# Patient Record
Sex: MALE | Race: WHITE | NOT HISPANIC OR LATINO | Employment: PART TIME | URBAN - METROPOLITAN AREA
[De-identification: names, ages, dates, MRNs, and addresses within clinical notes are randomized per-mention and may not be internally consistent; named-entity substitution may affect disease eponyms.]

---

## 2018-07-09 ENCOUNTER — OFFICE VISIT (OUTPATIENT)
Dept: MEDICAL GROUP | Facility: PHYSICIAN GROUP | Age: 43
End: 2018-07-09
Payer: COMMERCIAL

## 2018-07-09 VITALS
HEART RATE: 94 BPM | TEMPERATURE: 97.3 F | BODY MASS INDEX: 37.47 KG/M2 | HEIGHT: 69 IN | WEIGHT: 253 LBS | DIASTOLIC BLOOD PRESSURE: 70 MMHG | SYSTOLIC BLOOD PRESSURE: 110 MMHG | OXYGEN SATURATION: 95 % | RESPIRATION RATE: 14 BRPM

## 2018-07-09 DIAGNOSIS — S88.111D UNILATERAL COMPLETE BKA, RIGHT, SUBSEQUENT ENCOUNTER (HCC): ICD-10-CM

## 2018-07-09 DIAGNOSIS — Z00.00 WELL ADULT EXAM: ICD-10-CM

## 2018-07-09 DIAGNOSIS — Z87.2 HISTORY OF FOLLICULITIS: ICD-10-CM

## 2018-07-09 DIAGNOSIS — E66.9 OBESITY (BMI 30-39.9): ICD-10-CM

## 2018-07-09 PROCEDURE — 99386 PREV VISIT NEW AGE 40-64: CPT | Performed by: FAMILY MEDICINE

## 2018-07-09 RX ORDER — DOXYCYCLINE HYCLATE 100 MG
100 TABLET ORAL 2 TIMES DAILY
Qty: 20 TAB | Refills: 0 | Status: SHIPPED | OUTPATIENT
Start: 2018-07-09 | End: 2022-03-15

## 2018-07-09 ASSESSMENT — ENCOUNTER SYMPTOMS
PALPITATIONS: 0
COUGH: 0
DEPRESSION: 0
DOUBLE VISION: 0
GASTROINTESTINAL NEGATIVE: 1
DIZZINESS: 0
HEADACHES: 0
MUSCULOSKELETAL NEGATIVE: 1
NAUSEA: 0
TINGLING: 0
FEVER: 0
RESPIRATORY NEGATIVE: 1
CARDIOVASCULAR NEGATIVE: 1
MYALGIAS: 0
HEARTBURN: 0
CONSTITUTIONAL NEGATIVE: 1
CHILLS: 0
PSYCHIATRIC NEGATIVE: 1
HEMOPTYSIS: 0
BRUISES/BLEEDS EASILY: 0
BLURRED VISION: 0
EYES NEGATIVE: 1
NEUROLOGICAL NEGATIVE: 1

## 2018-07-09 ASSESSMENT — PATIENT HEALTH QUESTIONNAIRE - PHQ9: CLINICAL INTERPRETATION OF PHQ2 SCORE: 0

## 2018-07-10 NOTE — PROGRESS NOTES
Subjective:      Telly Solomon is a 43 y.o. male who presents with Leg Pain            1. Unilateral complete BKA, right, subsequent encounter ([facility])  h/o right bka from fall 5 years ago  - Patient identified as having weight management issue.  Appropriate orders and counseling given.  - NON SPECIFIED; Right knee bka care  Dispense: 1 Each; Refill: 0  - doxycycline (VIBRAMYCIN) 100 MG Tab; Take 1 Tab by mouth 2 times a day.  Dispense: 20 Tab; Refill: 0  - COMP METABOLIC PANEL; Future  - FREE THYROXINE; Future  - LIPID PROFILE; Future  - TRIIDOTHYRONINE; Future  - TSH; Future  - VITAMIN D,25 HYDROXY; Future  - CBC WITHOUT DIFFERENTIAL; Future    2. Obesity (BMI 30-39.9)    - Patient identified as having weight management issue.  Appropriate orders and counseling given.  - NON SPECIFIED; Right knee bka care  Dispense: 1 Each; Refill: 0  - doxycycline (VIBRAMYCIN) 100 MG Tab; Take 1 Tab by mouth 2 times a day.  Dispense: 20 Tab; Refill: 0  - COMP METABOLIC PANEL; Future  - FREE THYROXINE; Future  - LIPID PROFILE; Future  - TRIIDOTHYRONINE; Future  - TSH; Future  - VITAMIN D,25 HYDROXY; Future  - CBC WITHOUT DIFFERENTIAL; Future    3. History of folliculitis  Gets some infected hair follicles on back of lower leg at times likely due to using sleeve to go into prosthesis  Gave him rx for abx if this happens again but also advised on the use of cetaphil soap to prevent infections  - Patient identified as having weight management issue.  Appropriate orders and counseling given.  - NON SPECIFIED; Right knee bka care  Dispense: 1 Each; Refill: 0  - doxycycline (VIBRAMYCIN) 100 MG Tab; Take 1 Tab by mouth 2 times a day.  Dispense: 20 Tab; Refill: 0  - COMP METABOLIC PANEL; Future  - FREE THYROXINE; Future  - LIPID PROFILE; Future  - TRIIDOTHYRONINE; Future  - TSH; Future  - VITAMIN D,25 HYDROXY; Future  - CBC WITHOUT DIFFERENTIAL; Future    4. Well adult exam    - Patient identified as having weight management issue.   Appropriate orders and counseling given.  - NON SPECIFIED; Right knee bka care  Dispense: 1 Each; Refill: 0  - doxycycline (VIBRAMYCIN) 100 MG Tab; Take 1 Tab by mouth 2 times a day.  Dispense: 20 Tab; Refill: 0  - COMP METABOLIC PANEL; Future  - FREE THYROXINE; Future  - LIPID PROFILE; Future  - TRIIDOTHYRONINE; Future  - TSH; Future  - VITAMIN D,25 HYDROXY; Future  - CBC WITHOUT DIFFERENTIAL; Future    Past Medical History:  No date: Heart burn  04-15-13: Pain      Comment: back, right leg, 3-4/10  No date: Snoring  Past Surgical History:  5/7/2013: IRRIGATION & DEBRIDEMENT ORTHO      Comment: Performed by Elvis Bowen M.D. at                SURGERY [facility] ORS  5/7/2013: WOUND CLOSURE ORTHO      Comment: Performed by Elvis Bowen M.D. at                SURGERY [facility] ORS  4/17/2013: KNEE AMPUTATION BELOW      Comment: Performed by Elvis Bowen M.D. at                SURGERY SAME DAY [facility] ORS  6/29/2012: KNEE AMPUTATION BELOW      Comment: Performed by ELVIS BOWEN at SURGERY                [facility] ORS  6/25/2012: IRRIGATION & DEBRIDEMENT GENERAL      Comment: Performed by JEANETTE DONALDSON JR at SURGERY               [facility] ORS  6/25/2012: FLAP FREE      Comment: Performed by JEANETTE DONALDSON JR at SURGERY               [facility] ORS  6/25/2012: SPLIT THICKNESS SKIN GRAFT      Comment: Performed by JEANETTE DONALDSON JR at SURGERY               [facility] ORS  6/25/2012: FLAP FREE      Comment: Performed by JEANETTE DONALDSON JR at SURGERY               [facility] ORS  6/24/2012: TIBIA ORIF      Comment: Performed by MERLINE MARTIN at SURGERY                [facility] ORS  6/24/2012: IRRIGATION & DEBRIDEMENT ORTHO      Comment: Performed by MERLINE MARTIN at SURGERY                [facility] ORS  6/19/2012: IRRIGATION & DEBRIDEMENT ORTHO      Comment: Performed by MERLINE MARTIN at SURGERY                [facility] ORS  6/17/2012: INCISION AND  "DRAINAGE ORTHOPEDIC      Comment: Performed by MERLINE MARTIN at SURGERY                [facility] ORS  6/17/2012: EXTERNAL FIXATOR APPLICATION      Comment: Performed by MERLINE MARTIN at SURGERY                [facility] ORS  6/15/2012: EXTERNAL FIXATOR APPLICATION      Comment: Performed by JAYLA LARIOS at SURGERY [facility] ORS  6/15/2012: INCISION AND DRAINAGE ORTHOPEDIC      Comment: Performed by JAYLA LARIOS at SURGERY [facility] ORS  6/15/2012: CHEST TUBE INSERTION      Comment: Performed by JAYLA LARIOS at SURGERY [facility] ORS  Smoking status: Current Some Day Smoker                                                    Packs/day: 0.00      Years: 0.00         Types: Cigarettes  Smokeless tobacco: Never Used                      Comment: \"about 3-4 cigarettes per day\"  Alcohol use: No              History reviewed.  No pertinent family history.      Current Outpatient Prescriptions: •  NON SPECIFIED, Right knee bka care, Disp: 1 Each, Rfl: 0•  doxycycline (VIBRAMYCIN) 100 MG Tab, Take 1 Tab by mouth 2 times a day., Disp: 20 Tab, Rfl: 0•  oxycodone, immediate release, (ROXICODONE) 5 MG TABS, Take 1 Tab by mouth every four hours as needed for Mild Pain. (Patient not taking: Reported on 7/9/2018), Disp: 60 Tab, Rfl: 0•  oxycodone, immediate release, (ROXICODONE) 5 MG TABS, Take 1-3 Tabs by mouth every 3 hours as needed ( Take one tablet for mild to moderate pain, take two tablets for moderate pain, take three tablets for moderate-severe). Indications: Moderate to Severe Pain (Patient not taking: Reported on 7/9/2018), Disp: 60 Tab, Rfl: 0•  alprazolam (XANAX) 0.5 MG TABS, Take 0.5 mg by mouth as needed., Disp: , Rfl:      Patient was instructed on the use of medications, either prescriptions or OTC and informed on when the appropriate follow up time period should be. In addition, patient was also instructed that should any acute worsening occur that they " "should notify this clinic asap or call 911.            Review of Systems   Constitutional: Negative.  Negative for chills and fever.   HENT: Negative.  Negative for hearing loss.    Eyes: Negative.  Negative for blurred vision and double vision.   Respiratory: Negative.  Negative for cough and hemoptysis.    Cardiovascular: Negative.  Negative for chest pain and palpitations.   Gastrointestinal: Negative.  Negative for heartburn and nausea.   Genitourinary: Negative.  Negative for dysuria.   Musculoskeletal: Negative.  Negative for myalgias.   Skin: Positive for itching and rash.   Neurological: Negative.  Negative for dizziness, tingling and headaches.   Endo/Heme/Allergies: Negative.  Does not bruise/bleed easily.   Psychiatric/Behavioral: Negative.  Negative for depression and suicidal ideas.   All other systems reviewed and are negative.         Objective:     /70   Pulse 94   Temp 36.3 °C (97.3 °F)   Resp 14   Ht 1.753 m (5' 9\")   Wt 114.8 kg (253 lb)   SpO2 95%   BMI 37.36 kg/m²      Physical Exam   Constitutional: He is oriented to person, place, and time. He appears well-developed and well-nourished. No distress.   HENT:   Head: Normocephalic and atraumatic.   Mouth/Throat: Oropharynx is clear and moist. No oropharyngeal exudate.   Eyes: Pupils are equal, round, and reactive to light.   Cardiovascular: Normal rate, regular rhythm, normal heart sounds and intact distal pulses.  Exam reveals no gallop and no friction rub.    No murmur heard.  Pulmonary/Chest: Effort normal and breath sounds normal. No respiratory distress. He has no wheezes. He has no rales. He exhibits no tenderness.   Neurological: He is alert and oriented to person, place, and time.   Skin: He is not diaphoretic.   Scarring from infected hair follicles on posterior right lower left stump, no active infection   Psychiatric: He has a normal mood and affect. His behavior is normal. Judgment and thought content normal.   Nursing " note and vitals reviewed.              Assessment/Plan:     1. Unilateral complete BKA, right, subsequent encounter ([facility])    - Patient identified as having weight management issue.  Appropriate orders and counseling given.  - NON SPECIFIED; Right knee bka care  Dispense: 1 Each; Refill: 0  - doxycycline (VIBRAMYCIN) 100 MG Tab; Take 1 Tab by mouth 2 times a day.  Dispense: 20 Tab; Refill: 0  - COMP METABOLIC PANEL; Future  - FREE THYROXINE; Future  - LIPID PROFILE; Future  - TRIIDOTHYRONINE; Future  - TSH; Future  - VITAMIN D,25 HYDROXY; Future  - CBC WITHOUT DIFFERENTIAL; Future    2. Obesity (BMI 30-39.9)    - Patient identified as having weight management issue.  Appropriate orders and counseling given.  - NON SPECIFIED; Right knee bka care  Dispense: 1 Each; Refill: 0  - doxycycline (VIBRAMYCIN) 100 MG Tab; Take 1 Tab by mouth 2 times a day.  Dispense: 20 Tab; Refill: 0  - COMP METABOLIC PANEL; Future  - FREE THYROXINE; Future  - LIPID PROFILE; Future  - TRIIDOTHYRONINE; Future  - TSH; Future  - VITAMIN D,25 HYDROXY; Future  - CBC WITHOUT DIFFERENTIAL; Future    3. History of folliculitis    - Patient identified as having weight management issue.  Appropriate orders and counseling given.  - NON SPECIFIED; Right knee bka care  Dispense: 1 Each; Refill: 0  - doxycycline (VIBRAMYCIN) 100 MG Tab; Take 1 Tab by mouth 2 times a day.  Dispense: 20 Tab; Refill: 0  - COMP METABOLIC PANEL; Future  - FREE THYROXINE; Future  - LIPID PROFILE; Future  - TRIIDOTHYRONINE; Future  - TSH; Future  - VITAMIN D,25 HYDROXY; Future  - CBC WITHOUT DIFFERENTIAL; Future    4. Well adult e  - Patient identified as having weight management issue.  Appropriate orders and counseling given.  - NON SPECIFIED; Right knee bka care  Dispense: 1 Each; Refill: 0  - doxycycline (VIBRAMYCIN) 100 MG Tab; Take 1 Tab by mouth 2 times a day.  Dispense: 20 Tab; Refill: 0  - COMP METABOLIC PANEL; Future  - FREE THYROXINE; Future  - LIPID PROFILE; Future  -  TRIIDOTHYRONINE; Future  - TSH; Future  - VITAMIN D,25 HYDROXY; Future  - CBC WITHOUT DIFFERENTIAL; Future

## 2018-07-16 ENCOUNTER — HOSPITAL ENCOUNTER (OUTPATIENT)
Dept: LAB | Facility: MEDICAL CENTER | Age: 43
End: 2018-07-16
Attending: FAMILY MEDICINE
Payer: COMMERCIAL

## 2018-07-16 DIAGNOSIS — Z00.00 WELL ADULT EXAM: ICD-10-CM

## 2018-07-16 DIAGNOSIS — Z87.2 HISTORY OF FOLLICULITIS: ICD-10-CM

## 2018-07-16 DIAGNOSIS — S88.111D UNILATERAL COMPLETE BKA, RIGHT, SUBSEQUENT ENCOUNTER (HCC): ICD-10-CM

## 2018-07-16 DIAGNOSIS — E66.9 OBESITY (BMI 30-39.9): ICD-10-CM

## 2018-07-16 LAB
25(OH)D3 SERPL-MCNC: 20 NG/ML (ref 30–100)
ERYTHROCYTE [DISTWIDTH] IN BLOOD BY AUTOMATED COUNT: 43.8 FL (ref 35.9–50)
HCT VFR BLD AUTO: 51.2 % (ref 42–52)
HGB BLD-MCNC: 17.6 G/DL (ref 14–18)
MCH RBC QN AUTO: 30.7 PG (ref 27–33)
MCHC RBC AUTO-ENTMCNC: 34.4 G/DL (ref 33.7–35.3)
MCV RBC AUTO: 89.2 FL (ref 81.4–97.8)
PLATELET # BLD AUTO: 229 K/UL (ref 164–446)
PMV BLD AUTO: 9.9 FL (ref 9–12.9)
RBC # BLD AUTO: 5.74 M/UL (ref 4.7–6.1)
T3 SERPL-MCNC: 90.3 NG/DL (ref 60–181)
T4 FREE SERPL-MCNC: 0.75 NG/DL (ref 0.53–1.43)
TSH SERPL DL<=0.005 MIU/L-ACNC: 1.9 UIU/ML (ref 0.38–5.33)
WBC # BLD AUTO: 7.3 K/UL (ref 4.8–10.8)

## 2018-07-16 PROCEDURE — 84443 ASSAY THYROID STIM HORMONE: CPT

## 2018-07-16 PROCEDURE — 85027 COMPLETE CBC AUTOMATED: CPT

## 2018-07-16 PROCEDURE — 84439 ASSAY OF FREE THYROXINE: CPT

## 2018-07-16 PROCEDURE — 36415 COLL VENOUS BLD VENIPUNCTURE: CPT

## 2018-07-16 PROCEDURE — 80053 COMPREHEN METABOLIC PANEL: CPT

## 2018-07-16 PROCEDURE — 80061 LIPID PANEL: CPT

## 2018-07-16 PROCEDURE — 82306 VITAMIN D 25 HYDROXY: CPT

## 2018-07-16 PROCEDURE — 84480 ASSAY TRIIODOTHYRONINE (T3): CPT

## 2018-07-17 LAB
ALBUMIN SERPL BCP-MCNC: 4.1 G/DL (ref 3.2–4.9)
ALBUMIN/GLOB SERPL: 1.5 G/DL
ALP SERPL-CCNC: 73 U/L (ref 30–99)
ALT SERPL-CCNC: 26 U/L (ref 2–50)
ANION GAP SERPL CALC-SCNC: 8 MMOL/L (ref 0–11.9)
AST SERPL-CCNC: 19 U/L (ref 12–45)
BILIRUB SERPL-MCNC: 0.4 MG/DL (ref 0.1–1.5)
BUN SERPL-MCNC: 10 MG/DL (ref 8–22)
CALCIUM SERPL-MCNC: 9.3 MG/DL (ref 8.5–10.5)
CHLORIDE SERPL-SCNC: 108 MMOL/L (ref 96–112)
CHOLEST SERPL-MCNC: 183 MG/DL (ref 100–199)
CO2 SERPL-SCNC: 24 MMOL/L (ref 20–33)
CREAT SERPL-MCNC: 0.91 MG/DL (ref 0.5–1.4)
GLOBULIN SER CALC-MCNC: 2.7 G/DL (ref 1.9–3.5)
GLUCOSE SERPL-MCNC: 104 MG/DL (ref 65–99)
HDLC SERPL-MCNC: 47 MG/DL
LDLC SERPL CALC-MCNC: 103 MG/DL
POTASSIUM SERPL-SCNC: 4.7 MMOL/L (ref 3.6–5.5)
PROT SERPL-MCNC: 6.8 G/DL (ref 6–8.2)
SODIUM SERPL-SCNC: 140 MMOL/L (ref 135–145)
TRIGL SERPL-MCNC: 167 MG/DL (ref 0–149)

## 2018-07-19 ENCOUNTER — TELEPHONE (OUTPATIENT)
Dept: MEDICAL GROUP | Facility: PHYSICIAN GROUP | Age: 43
End: 2018-07-19

## 2018-07-19 NOTE — TELEPHONE ENCOUNTER
----- Message from Hayder Lal M.D. sent at 7/17/2018 11:22 AM PDT -----  Labs show patient is low on vitamin d, they needs to replace this with 2000 units per day otc

## 2020-01-13 ENCOUNTER — OFFICE VISIT (OUTPATIENT)
Dept: MEDICAL GROUP | Facility: PHYSICIAN GROUP | Age: 45
End: 2020-01-13
Payer: COMMERCIAL

## 2020-01-13 VITALS
WEIGHT: 266 LBS | HEIGHT: 70 IN | BODY MASS INDEX: 38.08 KG/M2 | HEART RATE: 82 BPM | OXYGEN SATURATION: 96 % | TEMPERATURE: 97.5 F | DIASTOLIC BLOOD PRESSURE: 76 MMHG | SYSTOLIC BLOOD PRESSURE: 138 MMHG

## 2020-01-13 DIAGNOSIS — E66.9 OBESITY (BMI 30-39.9): ICD-10-CM

## 2020-01-13 DIAGNOSIS — H65.01 RIGHT ACUTE SEROUS OTITIS MEDIA, RECURRENCE NOT SPECIFIED: ICD-10-CM

## 2020-01-13 DIAGNOSIS — B35.4 TINEA CORPORIS: ICD-10-CM

## 2020-01-13 PROCEDURE — 99214 OFFICE O/P EST MOD 30 MIN: CPT | Performed by: FAMILY MEDICINE

## 2020-01-13 RX ORDER — CLOTRIMAZOLE AND BETAMETHASONE DIPROPIONATE 10; .64 MG/G; MG/G
1 CREAM TOPICAL 2 TIMES DAILY
Qty: 1 TUBE | Refills: 3 | Status: SHIPPED | OUTPATIENT
Start: 2020-01-13 | End: 2021-02-01

## 2020-01-13 RX ORDER — AMOXICILLIN 500 MG/1
500 CAPSULE ORAL 3 TIMES DAILY
Qty: 30 CAP | Refills: 0 | Status: SHIPPED | OUTPATIENT
Start: 2020-01-13 | End: 2022-03-15

## 2020-01-13 RX ORDER — HYDROCODONE BITARTRATE AND ACETAMINOPHEN 5; 325 MG/1; MG/1
1-2 TABLET ORAL EVERY 4 HOURS PRN
COMMUNITY

## 2020-01-13 RX ORDER — AZITHROMYCIN 500 MG/1
TABLET, FILM COATED ORAL
COMMUNITY
Start: 2020-01-02 | End: 2022-03-15

## 2020-01-13 ASSESSMENT — ENCOUNTER SYMPTOMS
PSYCHIATRIC NEGATIVE: 1
CHILLS: 0
CONSTITUTIONAL NEGATIVE: 1
CARDIOVASCULAR NEGATIVE: 1
COUGH: 0
BRUISES/BLEEDS EASILY: 0
HEADACHES: 0
FEVER: 0
NAUSEA: 0
RESPIRATORY NEGATIVE: 1
MYALGIAS: 0
HEARTBURN: 0
BLURRED VISION: 0
DEPRESSION: 0
DOUBLE VISION: 0
EYES NEGATIVE: 1
MUSCULOSKELETAL NEGATIVE: 1
TINGLING: 0
GASTROINTESTINAL NEGATIVE: 1
DIZZINESS: 0
HEMOPTYSIS: 0
PALPITATIONS: 0
NEUROLOGICAL NEGATIVE: 1

## 2020-01-13 NOTE — PROGRESS NOTES
Subjective:      Telly Solomon is a 45 y.o. male who presents with Ear Fullness (x1 month. Loss of hearing in Rt ear, sinus congestion.) and Rash (Rash on Lt leg and flanks.)            1. Tinea corporis  Macular rash on leg and body  - clotrimazole-betamethasone (LOTRISONE) 1-0.05 % Cream; Apply 1 g to affected area(s) 2 times a day.  Dispense: 1 Tube; Refill: 3    2. Right acute serous otitis media, recurrence not specified  Pain in the right ear with decreased hearing for one month, no fever or chills  - amoxicillin (AMOXIL) 500 MG Cap; Take 1 Cap by mouth 3 times a day.  Dispense: 30 Cap; Refill: 0    3. Obesity (BMI 30-39.9)  Patient has a diagnosis of obesity. They were counseled today on increasing exercise and  extensively counseled on a low carb diet       Past Medical History:  No date: Heart burn  04-15-13: Pain      Comment:  back, right leg, 3-4/10  No date: Snoring  Past Surgical History:  5/7/2013: IRRIGATION & DEBRIDEMENT ORTHO      Comment:  Performed by Elvis Low M.D. at SURGERY [facility] ORS  5/7/2013: WOUND CLOSURE ORTHO      Comment:  Performed by Elvis Low M.D. at SURGERY [facility] ORS  4/17/2013: KNEE AMPUTATION BELOW      Comment:  Performed by Elvis Low M.D. at SURGERY SAME DAY               [facility] ORS  6/29/2012: KNEE AMPUTATION BELOW      Comment:  Performed by ELVIS LOW at SURGERY [facility]                ORS  6/25/2012: IRRIGATION & DEBRIDEMENT GENERAL      Comment:  Performed by JEANETTE DONALDSON JR at SURGERY [facility] ORS  6/25/2012: FLAP FREE      Comment:  Performed by JEANETTE DONALDSON JR at SURGERY [facility] ORS  6/25/2012: SPLIT THICKNESS SKIN GRAFT      Comment:  Performed by JEANETTE DONALDSON JR at SURGERY [facility] ORS  6/25/2012: FLAP FREE      Comment:  Performed by JEANETTE DONALDSON JR at SURGERY [facility] ORS  6/24/2012:  "TIBIA ORIF      Comment:  Performed by MERLINE MARTIN at SURGERY [facility]                ORS  6/24/2012: IRRIGATION & DEBRIDEMENT ORTHO      Comment:  Performed by MERLINE MARTIN at SURGERY [facility]                ORS  6/19/2012: IRRIGATION & DEBRIDEMENT ORTHO      Comment:  Performed by MERLINE MARTIN at SURGERY [facility]                ORS  6/17/2012: INCISION AND DRAINAGE ORTHOPEDIC      Comment:  Performed by MERLINE MARTIN at SURGERY [facility]                ORS  6/17/2012: EXTERNAL FIXATOR APPLICATION      Comment:  Performed by MERLINE MARTIN at SURGERY [facility]                ORS  6/15/2012: EXTERNAL FIXATOR APPLICATION      Comment:  Performed by JAYLA LARIOS at SURGERY [facility] ORS  6/15/2012: INCISION AND DRAINAGE ORTHOPEDIC      Comment:  Performed by JAYLA LARIOS at SURGERY [facility] ORS  6/15/2012: CHEST TUBE INSERTION      Comment:  Performed by JAYLA LARIOS at SURGERY [facility] ORS  Social History    Tobacco Use      Smoking status: Current Some Day Smoker        Types: Cigarettes      Smokeless tobacco: Never Used      Tobacco comment: \"about 3-4 cigarettes per day\"    Alcohol use: No    Drug use: No    No family history on file.      Current Outpatient Medications: •  HYDROcodone-acetaminophen (NORCO) 5-325 MG Tab per tablet, Take 1-2 Tabs by mouth every four hours as needed (1 PO Daily PRN)., Disp: , Rfl: •  clotrimazole-betamethasone (LOTRISONE) 1-0.05 % Cream, Apply 1 g to affected area(s) 2 times a day., Disp: 1 Tube, Rfl: 3•  amoxicillin (AMOXIL) 500 MG Cap, Take 1 Cap by mouth 3 times a day., Disp: 30 Cap, Rfl: 0•  azithromycin (ZITHROMAX) 500 MG tablet, , Disp: , Rfl: •  NON SPECIFIED, Right knee bka care (Patient not taking: Reported on 1/13/2020), Disp: 1 Each, Rfl: 0•  doxycycline (VIBRAMYCIN) 100 MG Tab, Take 1 Tab by mouth 2 times a day. (Patient not taking: Reported on 1/13/2020), Disp: 20 Tab, Rfl: 0•  oxycodone, immediate release, (ROXICODONE) 5 MG " "TABS, Take 1 Tab by mouth every four hours as needed for Mild Pain. (Patient not taking: Reported on 7/9/2018), Disp: 60 Tab, Rfl: 0•  oxycodone, immediate release, (ROXICODONE) 5 MG TABS, Take 1-3 Tabs by mouth every 3 hours as needed ( Take one tablet for mild to moderate pain, take two tablets for moderate pain, take three tablets for moderate-severe). Indications: Moderate to Severe Pain (Patient not taking: Reported on 7/9/2018), Disp: 60 Tab, Rfl: 0•  alprazolam (XANAX) 0.5 MG TABS, Take 0.5 mg by mouth as needed., Disp: , Rfl:      Patient was instructed on the use of medications, either prescriptions or OTC and informed on when the appropriate follow up time period should be. In addition, patient was also instructed that should any acute worsening occur that they should notify this clinic asap or call 911.          Review of Systems   Constitutional: Negative.  Negative for chills and fever.   HENT: Positive for ear pain. Negative for hearing loss.    Eyes: Negative.  Negative for blurred vision and double vision.   Respiratory: Negative.  Negative for cough and hemoptysis.    Cardiovascular: Negative.  Negative for chest pain and palpitations.   Gastrointestinal: Negative.  Negative for heartburn and nausea.   Genitourinary: Negative.  Negative for dysuria.   Musculoskeletal: Negative.  Negative for myalgias.   Skin: Positive for itching and rash.   Neurological: Negative.  Negative for dizziness, tingling and headaches.   Endo/Heme/Allergies: Negative.  Does not bruise/bleed easily.   Psychiatric/Behavioral: Negative.  Negative for depression and suicidal ideas.   All other systems reviewed and are negative.         Objective:     /76   Pulse 82   Temp 36.4 °C (97.5 °F)   Ht 1.778 m (5' 10\")   Wt 120.7 kg (266 lb)   SpO2 96%   BMI 38.17 kg/m²      Physical Exam  Vitals signs and nursing note reviewed.   Constitutional:       General: He is not in acute distress.     Appearance: He is " well-developed. He is not diaphoretic.   HENT:      Head: Normocephalic and atraumatic.      Right Ear: A middle ear effusion is present. Tympanic membrane is injected and erythematous.      Mouth/Throat:      Pharynx: No oropharyngeal exudate.   Eyes:      Pupils: Pupils are equal, round, and reactive to light.   Cardiovascular:      Rate and Rhythm: Normal rate and regular rhythm.      Heart sounds: Normal heart sounds. No murmur. No friction rub. No gallop.    Pulmonary:      Effort: Pulmonary effort is normal. No respiratory distress.      Breath sounds: Normal breath sounds. No wheezing or rales.   Chest:      Chest wall: No tenderness.   Skin:     Findings: Rash present. Rash is macular.          Neurological:      Mental Status: He is alert and oriented to person, place, and time.   Psychiatric:         Behavior: Behavior normal.         Thought Content: Thought content normal.         Judgment: Judgment normal.                 Assessment/Plan:       1. Tinea corporis    - clotrimazole-betamethasone (LOTRISONE) 1-0.05 % Cream; Apply 1 g to affected area(s) 2 times a day.  Dispense: 1 Tube; Refill: 3    2. Right acute serous otitis media, recurrence not specified  ]  - amoxicillin (AMOXIL) 500 MG Cap; Take 1 Cap by mouth 3 times a day.  Dispense: 30 Cap; Refill: 0    3. Obesity (BMI 30-39.9)

## 2021-02-01 DIAGNOSIS — B35.4 TINEA CORPORIS: ICD-10-CM

## 2021-02-01 RX ORDER — CLOTRIMAZOLE AND BETAMETHASONE DIPROPIONATE 10; .64 MG/G; MG/G
CREAM TOPICAL
Qty: 15 G | Refills: 2 | Status: SHIPPED | OUTPATIENT
Start: 2021-02-01 | End: 2022-03-15

## 2021-02-01 NOTE — TELEPHONE ENCOUNTER
Received request via: Pharmacy    Was the patient seen in the last year in this department? No     Does the patient have an active prescription (recently filled or refills available) for medication(s) requested? No     Patient has not been seen in 1 year, I made the patient an appointment for 2/9/21 at 8:30.

## 2021-02-09 ENCOUNTER — TELEMEDICINE (OUTPATIENT)
Dept: MEDICAL GROUP | Facility: PHYSICIAN GROUP | Age: 46
End: 2021-02-09
Payer: COMMERCIAL

## 2021-02-09 VITALS — BODY MASS INDEX: 34.36 KG/M2 | HEIGHT: 70 IN | WEIGHT: 240 LBS

## 2021-02-09 DIAGNOSIS — Z00.00 WELL ADULT EXAM: ICD-10-CM

## 2021-02-09 DIAGNOSIS — S88.111D UNILATERAL COMPLETE BKA, RIGHT, SUBSEQUENT ENCOUNTER (HCC): ICD-10-CM

## 2021-02-09 DIAGNOSIS — E66.9 OBESITY (BMI 30-39.9): ICD-10-CM

## 2021-02-09 PROCEDURE — 99396 PREV VISIT EST AGE 40-64: CPT | Mod: 95,CR | Performed by: FAMILY MEDICINE

## 2021-02-09 NOTE — PROGRESS NOTES
Virtual Visit: Established Patient   This visit was conducted via Zoom using secure and encrypted videoconferencing technology. The patient was in a private location in the state [facility].    The patient's identity was confirmed and verbal consent was obtained for this virtual visit.    Subjective:   CC:   Chief Complaint   Patient presents with   • Annual Exam     lab orders        Telly Solomon is a 46 y.o. male presenting for evaluation and management of:    1. Well adult exam  This patient was here for a preventative medicine visit today. The Health Maintenance issues that are appropriate for this patient's age and sex were discussed and any that they agreed to were ordered. The patient was also give age and sex appropriate counseling for preventative matters such as diet, exercise, medication usage, and social determinants.    - Comp Metabolic Panel; Future  - FREE THYROXINE; Future  - TRIIDOTHYRONINE; Future  - Lipid Profile; Future  - CBC WITHOUT DIFFERENTIAL; Future    2. Obesity (BMI 30-39.9)      - Comp Metabolic Panel; Future  - FREE THYROXINE; Future  - TRIIDOTHYRONINE; Future  - Lipid Profile; Future  - CBC WITHOUT DIFFERENTIAL; Future    Past Medical History:   Diagnosis Date   • Heart burn    • Pain 04-15-13    back, right leg, 3-4/10   • Snoring      Past Surgical History:   Procedure Laterality Date   • IRRIGATION & DEBRIDEMENT ORTHO  5/7/2013    Performed by Elvis Low M.D. at SURGERY [facility]   • WOUND CLOSURE ORTHO  5/7/2013    Performed by Elvis Low M.D. at SURGERY [facility]   • KNEE AMPUTATION BELOW  4/17/2013    Performed by Elvis Low M.D. at SURGERY SAME DAY [facility]   • KNEE AMPUTATION BELOW  6/29/2012    Performed by ELVIS LOW at SURGERY [facility]   • IRRIGATION & DEBRIDEMENT GENERAL  6/25/2012    Performed by JEANETTE DONALDSON JR at SURGERY [facility]   • FLAP FREE  6/25/2012    Performed by JEANETTE DONALDSON JR at  "SURGERY [facility] ORS   • SPLIT THICKNESS SKIN GRAFT  6/25/2012    Performed by JEANETTE DONALDSON JR at SURGERY [facility] ORS   • FLAP FREE  6/25/2012    Performed by JEANETTE DONALDSON JR at SURGERY [facility]   • TIBIA ORIF  6/24/2012    Performed by MERLINE MARTIN at SURGERY [facility] ORS   • IRRIGATION & DEBRIDEMENT ORTHO  6/24/2012    Performed by MERLINE MARTIN at SURGERY [facility] ORS   • IRRIGATION & DEBRIDEMENT ORTHO  6/19/2012    Performed by MERLINE MARTIN at SURGERY [facility] ORS   • INCISION AND DRAINAGE ORTHOPEDIC  6/17/2012    Performed by MERLINE MARTIN at SURGERY [facility] ORS   • EXTERNAL FIXATOR APPLICATION  6/17/2012    Performed by MERLINE MARTIN at [facility]   • EXTERNAL FIXATOR APPLICATION  6/15/2012    Performed by JAYLA LARIOS at SURGERY [facility]   • INCISION AND DRAINAGE ORTHOPEDIC  6/15/2012    Performed by JAYLA LARIOS at SURGERY [facility]   • CHEST TUBE INSERTION  6/15/2012    Performed by JAYLA LARIOS at SURGERY [facility]     Social History     Tobacco Use   • Smoking status: Current Some Day Smoker     Types: Cigarettes   • Smokeless tobacco: Never Used   • Tobacco comment: \"about 3-4 cigarettes per day\"   Substance Use Topics   • Alcohol use: No   • Drug use: No     No family history on file.      Current Outpatient Medications:   •  HYDROcodone-acetaminophen (NORCO) 5-325 MG Tab per tablet, Take 1-2 Tabs by mouth every four hours as needed (1 PO Daily PRN)., Disp: , Rfl:   •  clotrimazole-betamethasone (LOTRISONE) 1-0.05 % Cream, GENERIC LOTRISONE APPLY ONE GRAM TO AFFECTED AREA(S) TWO TIMES A DAY (Patient not taking: Reported on 2/9/2021), Disp: 15 g, Rfl: 2  •  azithromycin (ZITHROMAX) 500 MG tablet, , Disp: , Rfl:   •  amoxicillin (AMOXIL) 500 MG Cap, Take 1 Cap by mouth 3 times a day. (Patient not taking: Reported on 2/9/2021), Disp: 30 Cap, Rfl: 0  •  NON SPECIFIED, Right knee bka care (Patient not taking: Reported " "on 1/13/2020), Disp: 1 Each, Rfl: 0  •  doxycycline (VIBRAMYCIN) 100 MG Tab, Take 1 Tab by mouth 2 times a day. (Patient not taking: Reported on 1/13/2020), Disp: 20 Tab, Rfl: 0  •  oxycodone, immediate release, (ROXICODONE) 5 MG TABS, Take 1 Tab by mouth every four hours as needed for Mild Pain. (Patient not taking: Reported on 7/9/2018), Disp: 60 Tab, Rfl: 0  •  oxycodone, immediate release, (ROXICODONE) 5 MG TABS, Take 1-3 Tabs by mouth every 3 hours as needed ( Take one tablet for mild to moderate pain, take two tablets for moderate pain, take three tablets for moderate-severe). Indications: Moderate to Severe Pain (Patient not taking: Reported on 7/9/2018), Disp: 60 Tab, Rfl: 0  •  alprazolam (XANAX) 0.5 MG TABS, Take 0.5 mg by mouth as needed., Disp: , Rfl:     Patient was instructed on the use of medications, either prescriptions or OTC and informed on when the appropriate follow up time period should be. In addition, patient was also instructed that should any acute worsening occur that they should notify this clinic asap or call 911.        ROS   Denies any recent fevers or chills. No nausea or vomiting. No chest pains or shortness of breath.     Allergies   Allergen Reactions   • Morphine      \"makes me weird\" confussion       Current medicines (including changes today)  Current Outpatient Medications   Medication Sig Dispense Refill   • HYDROcodone-acetaminophen (NORCO) 5-325 MG Tab per tablet Take 1-2 Tabs by mouth every four hours as needed (1 PO Daily PRN).     • clotrimazole-betamethasone (LOTRISONE) 1-0.05 % Cream GENERIC LOTRISONE APPLY ONE GRAM TO AFFECTED AREA(S) TWO TIMES A DAY (Patient not taking: Reported on 2/9/2021) 15 g 2   • azithromycin (ZITHROMAX) 500 MG tablet      • amoxicillin (AMOXIL) 500 MG Cap Take 1 Cap by mouth 3 times a day. (Patient not taking: Reported on 2/9/2021) 30 Cap 0   • NON SPECIFIED Right knee bka care (Patient not taking: Reported on 1/13/2020) 1 Each 0   • " "doxycycline (VIBRAMYCIN) 100 MG Tab Take 1 Tab by mouth 2 times a day. (Patient not taking: Reported on 1/13/2020) 20 Tab 0   • oxycodone, immediate release, (ROXICODONE) 5 MG TABS Take 1 Tab by mouth every four hours as needed for Mild Pain. (Patient not taking: Reported on 7/9/2018) 60 Tab 0   • oxycodone, immediate release, (ROXICODONE) 5 MG TABS Take 1-3 Tabs by mouth every 3 hours as needed ( Take one tablet for mild to moderate pain, take two tablets for moderate pain, take three tablets for moderate-severe). Indications: Moderate to Severe Pain (Patient not taking: Reported on 7/9/2018) 60 Tab 0   • alprazolam (XANAX) 0.5 MG TABS Take 0.5 mg by mouth as needed.       No current facility-administered medications for this visit.        Patient Active Problem List    Diagnosis Date Noted   • Obesity (BMI 30-39.9) 07/09/2018   • Unilateral complete BKA, right, subsequent encounter ([facility]) 07/09/2018       No family history on file.    He  has a past medical history of Heart burn, Pain (04-15-13), and Snoring.  He  has a past surgical history that includes incision and drainage orthopedic (6/17/2012); external fixator application (6/17/2012); external fixator application (6/15/2012); incision and drainage orthopedic (6/15/2012); chest tube insertion (6/15/2012); irrigation & debridement ortho (6/19/2012); tibia orif (6/24/2012); irrigation & debridement ortho (6/24/2012); irrigation & debridement general (6/25/2012); flap free (6/25/2012); split thickness skin graft (6/25/2012); flap free (6/25/2012); knee amputation below (6/29/2012); knee amputation below (4/17/2013); irrigation & debridement ortho (5/7/2013); and wound closure ortho (5/7/2013).       Objective:   Ht 1.778 m (5' 10\")   Wt 109 kg (240 lb)   BMI 34.44 kg/m²     Physical Exam:  Constitutional: Alert, no distress, well-groomed.  Skin: No rashes in visible areas.  Eye: Round. Conjunctiva clear, lids normal. No icterus.   ENMT: Lips pink without " lesions, good dentition, moist mucous membranes. Phonation normal.  Neck: No masses, no thyromegaly. Moves freely without pain.  Respiratory: Unlabored respiratory effort, no cough or audible wheeze  Psych: Alert and oriented x3, normal affect and mood.       Assessment and Plan:   The following treatment plan was discussed:     1. Well adult exam  - Comp Metabolic Panel; Future  - FREE THYROXINE; Future  - TRIIDOTHYRONINE; Future  - Lipid Profile; Future  - CBC WITHOUT DIFFERENTIAL; Future    2. Obesity (BMI 30-39.9)  - Comp Metabolic Panel; Future  - FREE THYROXINE; Future  - TRIIDOTHYRONINE; Future  - Lipid Profile; Future  - CBC WITHOUT DIFFERENTIAL; Future        Follow-up: No follow-ups on file.

## 2021-02-10 ENCOUNTER — HOSPITAL ENCOUNTER (OUTPATIENT)
Dept: LAB | Facility: MEDICAL CENTER | Age: 46
End: 2021-02-10
Attending: FAMILY MEDICINE
Payer: COMMERCIAL

## 2021-02-10 DIAGNOSIS — E66.9 OBESITY (BMI 30-39.9): ICD-10-CM

## 2021-02-10 DIAGNOSIS — Z00.00 WELL ADULT EXAM: ICD-10-CM

## 2021-02-10 LAB
ALBUMIN SERPL BCP-MCNC: 4.2 G/DL (ref 3.2–4.9)
ALBUMIN/GLOB SERPL: 1.4 G/DL
ALP SERPL-CCNC: 80 U/L (ref 30–99)
ALT SERPL-CCNC: 36 U/L (ref 2–50)
ANION GAP SERPL CALC-SCNC: 9 MMOL/L (ref 7–16)
AST SERPL-CCNC: 21 U/L (ref 12–45)
BILIRUB SERPL-MCNC: 0.5 MG/DL (ref 0.1–1.5)
BUN SERPL-MCNC: 14 MG/DL (ref 8–22)
CALCIUM SERPL-MCNC: 9.3 MG/DL (ref 8.5–10.5)
CHLORIDE SERPL-SCNC: 100 MMOL/L (ref 96–112)
CHOLEST SERPL-MCNC: 207 MG/DL (ref 100–199)
CO2 SERPL-SCNC: 22 MMOL/L (ref 20–33)
CREAT SERPL-MCNC: 0.78 MG/DL (ref 0.5–1.4)
ERYTHROCYTE [DISTWIDTH] IN BLOOD BY AUTOMATED COUNT: 44 FL (ref 35.9–50)
FASTING STATUS PATIENT QL REPORTED: NORMAL
GLOBULIN SER CALC-MCNC: 2.9 G/DL (ref 1.9–3.5)
GLUCOSE SERPL-MCNC: 100 MG/DL (ref 65–99)
HCT VFR BLD AUTO: 52.2 % (ref 42–52)
HDLC SERPL-MCNC: 44 MG/DL
HGB BLD-MCNC: 18.3 G/DL (ref 14–18)
LDLC SERPL CALC-MCNC: 139 MG/DL
MCH RBC QN AUTO: 31.4 PG (ref 27–33)
MCHC RBC AUTO-ENTMCNC: 35.1 G/DL (ref 33.7–35.3)
MCV RBC AUTO: 89.5 FL (ref 81.4–97.8)
PLATELET # BLD AUTO: 225 K/UL (ref 164–446)
PMV BLD AUTO: 9.8 FL (ref 9–12.9)
POTASSIUM SERPL-SCNC: 4.6 MMOL/L (ref 3.6–5.5)
PROT SERPL-MCNC: 7.1 G/DL (ref 6–8.2)
RBC # BLD AUTO: 5.83 M/UL (ref 4.7–6.1)
SODIUM SERPL-SCNC: 131 MMOL/L (ref 135–145)
T3 SERPL-MCNC: 112 NG/DL (ref 60–181)
T4 FREE SERPL-MCNC: 1.23 NG/DL (ref 0.93–1.7)
TRIGL SERPL-MCNC: 118 MG/DL (ref 0–149)
WBC # BLD AUTO: 7.5 K/UL (ref 4.8–10.8)

## 2021-02-10 PROCEDURE — 85027 COMPLETE CBC AUTOMATED: CPT

## 2021-02-10 PROCEDURE — 84439 ASSAY OF FREE THYROXINE: CPT

## 2021-02-10 PROCEDURE — 80053 COMPREHEN METABOLIC PANEL: CPT

## 2021-02-10 PROCEDURE — 36415 COLL VENOUS BLD VENIPUNCTURE: CPT

## 2021-02-10 PROCEDURE — 80061 LIPID PANEL: CPT

## 2021-02-10 PROCEDURE — 84480 ASSAY TRIIODOTHYRONINE (T3): CPT

## 2021-02-11 ENCOUNTER — TELEPHONE (OUTPATIENT)
Dept: MEDICAL GROUP | Facility: PHYSICIAN GROUP | Age: 46
End: 2021-02-11

## 2021-02-11 DIAGNOSIS — Z87.09 HISTORY OF URI (UPPER RESPIRATORY INFECTION): ICD-10-CM

## 2021-02-11 NOTE — TELEPHONE ENCOUNTER
Phone Number Called: [phone]    Call outcome: Left detailed message for patient. Informed to call back with any additional questions.    Message: Called to inform patient that labs look ok    ----- Message from Hayder Lal M.D. sent at 2/11/2021  8:55 AM PST -----  Labs look ok

## 2021-02-11 NOTE — TELEPHONE ENCOUNTER
Phone Number Called: [phone]    Call outcome: Left detailed message for patient. Informed to call back with any additional questions.    Message: Called to inform patient that labs look ok          ----- Message from Hayder Lal M.D. sent at 2/11/2021  8:55 AM PST -----  Labs look ok

## 2021-03-06 ENCOUNTER — HOSPITAL ENCOUNTER (OUTPATIENT)
Dept: LAB | Facility: MEDICAL CENTER | Age: 46
End: 2021-03-06
Attending: FAMILY MEDICINE
Payer: COMMERCIAL

## 2021-03-06 LAB
COVID ORDER STATUS COVID19: NORMAL
SARS-COV-2 RNA RESP QL NAA+PROBE: NOTDETECTED
SPECIMEN SOURCE: NORMAL

## 2021-03-06 PROCEDURE — U0003 INFECTIOUS AGENT DETECTION BY NUCLEIC ACID (DNA OR RNA); SEVERE ACUTE RESPIRATORY SYNDROME CORONAVIRUS 2 (SARS-COV-2) (CORONAVIRUS DISEASE [COVID-19]), AMPLIFIED PROBE TECHNIQUE, MAKING USE OF HIGH THROUGHPUT TECHNOLOGIES AS DESCRIBED BY CMS-2020-01-R: HCPCS

## 2021-03-06 PROCEDURE — C9803 HOPD COVID-19 SPEC COLLECT: HCPCS

## 2021-03-06 PROCEDURE — U0005 INFEC AGEN DETEC AMPLI PROBE: HCPCS

## 2021-03-17 ENCOUNTER — HOSPITAL ENCOUNTER (OUTPATIENT)
Dept: LAB | Facility: MEDICAL CENTER | Age: 46
End: 2021-03-17
Attending: FAMILY MEDICINE
Payer: COMMERCIAL

## 2021-03-17 DIAGNOSIS — Z87.09 HISTORY OF URI (UPPER RESPIRATORY INFECTION): ICD-10-CM

## 2021-03-17 PROCEDURE — U0005 INFEC AGEN DETEC AMPLI PROBE: HCPCS

## 2021-03-17 PROCEDURE — U0003 INFECTIOUS AGENT DETECTION BY NUCLEIC ACID (DNA OR RNA); SEVERE ACUTE RESPIRATORY SYNDROME CORONAVIRUS 2 (SARS-COV-2) (CORONAVIRUS DISEASE [COVID-19]), AMPLIFIED PROBE TECHNIQUE, MAKING USE OF HIGH THROUGHPUT TECHNOLOGIES AS DESCRIBED BY CMS-2020-01-R: HCPCS

## 2021-03-17 PROCEDURE — C9803 HOPD COVID-19 SPEC COLLECT: HCPCS

## 2022-03-15 ENCOUNTER — OFFICE VISIT (OUTPATIENT)
Dept: MEDICAL GROUP | Facility: PHYSICIAN GROUP | Age: 47
End: 2022-03-15
Payer: COMMERCIAL

## 2022-03-15 VITALS
OXYGEN SATURATION: 97 % | DIASTOLIC BLOOD PRESSURE: 64 MMHG | BODY MASS INDEX: 36.22 KG/M2 | HEIGHT: 70 IN | HEART RATE: 74 BPM | WEIGHT: 253 LBS | TEMPERATURE: 95.5 F | SYSTOLIC BLOOD PRESSURE: 122 MMHG | RESPIRATION RATE: 16 BRPM

## 2022-03-15 DIAGNOSIS — E66.9 OBESITY (BMI 30-39.9): ICD-10-CM

## 2022-03-15 DIAGNOSIS — S88.111D UNILATERAL COMPLETE BKA, RIGHT, SUBSEQUENT ENCOUNTER (HCC): ICD-10-CM

## 2022-03-15 DIAGNOSIS — M79.641 RIGHT HAND PAIN: ICD-10-CM

## 2022-03-15 DIAGNOSIS — G89.29 OTHER CHRONIC PAIN: ICD-10-CM

## 2022-03-15 DIAGNOSIS — Z00.00 WELL ADULT EXAM: ICD-10-CM

## 2022-03-15 PROCEDURE — 99214 OFFICE O/P EST MOD 30 MIN: CPT | Performed by: FAMILY MEDICINE

## 2022-03-15 ASSESSMENT — ENCOUNTER SYMPTOMS
CARDIOVASCULAR NEGATIVE: 1
BRUISES/BLEEDS EASILY: 0
NAUSEA: 0
DEPRESSION: 0
PSYCHIATRIC NEGATIVE: 1
COUGH: 0
BLURRED VISION: 0
CHILLS: 0
PALPITATIONS: 0
HEADACHES: 0
DOUBLE VISION: 0
HEMOPTYSIS: 0
FEVER: 0
EYES NEGATIVE: 1
HEARTBURN: 0
NEUROLOGICAL NEGATIVE: 1
CONSTITUTIONAL NEGATIVE: 1
MYALGIAS: 0
DIZZINESS: 0
GASTROINTESTINAL NEGATIVE: 1
TINGLING: 0
RESPIRATORY NEGATIVE: 1

## 2022-03-15 ASSESSMENT — PATIENT HEALTH QUESTIONNAIRE - PHQ9: CLINICAL INTERPRETATION OF PHQ2 SCORE: 0

## 2022-03-15 ASSESSMENT — FIBROSIS 4 INDEX: FIB4 SCORE: 0.76

## 2022-03-15 NOTE — PROGRESS NOTES
Subjective     Telly Solomon is a 47 y.o. male who presents with Follow-Up ((R) hand concerns/would like yearly labs)            1. Right hand pain  Pain and swelling in the right mcp joint for past year, does not remember any trauma  On exam swelling present and unable to fully flex joint because of it   DX-HAND 3+ RIGHT; Future   Referral to Hand Surgery    2. Unilateral complete BKA, right, subsequent encounter ([facility])  Stable prosthesis    3. Obesity (BMI 30-39.9)  Patient has a diagnosis of obesity. They were counseled today on increasing exercise and  extensively counseled on a low carb diet       4. Other chronic pain  Sees pain clinic    5. Well adult exam     Lipid Profile; Future   Comp Metabolic Panel; Future    Past Medical History:  No date: Heart burn  04-15-13: Pain      Comment:  back, right leg, 3-4/10  No date: Snoring  Past Surgical History:  5/7/2013: IRRIGATION & DEBRIDEMENT ORTHO      Comment:  Performed by Elvis Low M.D. at SURGERY [facility] ORS  5/7/2013: WOUND CLOSURE ORTHO      Comment:  Performed by Elvis Low M.D. at SURGERY [facility] ORS  4/17/2013: KNEE AMPUTATION BELOW      Comment:  Performed by Elvis Low M.D. at SURGERY SAME DAY               [facility] ORS  6/29/2012: KNEE AMPUTATION BELOW      Comment:  Performed by ELVIS LOW at SURGERY [facility]                ORS  6/25/2012: IRRIGATION & DEBRIDEMENT GENERAL      Comment:  Performed by JEANETTE DONALDSON JR at SURGERY [facility] ORS  6/25/2012: FLAP FREE      Comment:  Performed by JEANETTE DONALDSON JR at SURGERY [facility] ORS  6/25/2012: SPLIT THICKNESS SKIN GRAFT      Comment:  Performed by JEANETTE DONALDSON JR at SURGERY [facility] ORS  6/25/2012: FLAP FREE      Comment:  Performed by JEANTETE DONALDSON JR at SURGERY [facility] ORS  6/24/2012: TIBIA ORIF      Comment:  Performed by  "MERLINE MARTIN at SURGERY [facility]                ORS  6/24/2012: IRRIGATION & DEBRIDEMENT ORTHO      Comment:  Performed by MERLINE MARTIN at SURGERY [facility]                ORS  6/19/2012: IRRIGATION & DEBRIDEMENT ORTHO      Comment:  Performed by MERLINE MARTIN at SURGERY [facility]                ORS  6/17/2012: INCISION AND DRAINAGE ORTHOPEDIC      Comment:  Performed by MERLINE MARTIN at SURGERY [facility]                ORS  6/17/2012: EXTERNAL FIXATOR APPLICATION      Comment:  Performed by MERLINE MARTIN at SURGERY [facility]                ORS  6/15/2012: EXTERNAL FIXATOR APPLICATION      Comment:  Performed by JAYLA LARIOS at SURGERY [facility] ORS  6/15/2012: INCISION AND DRAINAGE ORTHOPEDIC      Comment:  Performed by JAYLA LARIOS at SURGERY [facility] ORS  6/15/2012: CHEST TUBE INSERTION      Comment:  Performed by JAYLA LARIOS at SURGERY [facility] ORS  Social History    Tobacco Use      Smoking status: Current Some Day Smoker        Types: Cigarettes      Smokeless tobacco: Never Used      Tobacco comment: \"about 3-4 cigarettes per day\"    Alcohol use: No    Drug use: No    No family history on file.      Current Outpatient Medications: •  HYDROcodone-acetaminophen (NORCO) 5-325 MG Tab per tablet, Take 1-2 Tabs by mouth every four hours as needed (1 PO Daily PRN)., Disp: , Rfl:     Patient was instructed on the use of medications, either prescriptions or OTC and informed on when the appropriate follow up time period should be. In addition, patient was also instructed that should any acute worsening occur that they should notify this clinic asap or call 911.          Review of Systems   Constitutional: Negative.  Negative for chills and fever.   HENT: Negative.  Negative for hearing loss.    Eyes: Negative.  Negative for blurred vision and double vision.   Respiratory: Negative.  Negative for cough and hemoptysis.    Cardiovascular: Negative.  Negative for chest pain and " "palpitations.   Gastrointestinal: Negative.  Negative for heartburn and nausea.   Genitourinary: Negative.  Negative for dysuria.   Musculoskeletal: Positive for joint pain. Negative for myalgias.   Skin: Negative.  Negative for rash.   Neurological: Negative.  Negative for dizziness, tingling and headaches.   Endo/Heme/Allergies: Negative.  Does not bruise/bleed easily.   Psychiatric/Behavioral: Negative.  Negative for depression and suicidal ideas.   All other systems reviewed and are negative.             Objective     /64 (BP Location: Left arm, Patient Position: Sitting, BP Cuff Size: Adult)   Pulse 74   Temp (!) 35.3 °C (95.5 °F) (Temporal)   Resp 16   Ht 1.778 m (5' 10\")   Wt 115 kg (253 lb)   SpO2 97%   BMI 36.30 kg/m²      Physical Exam  Vitals and nursing note reviewed.   Constitutional:       General: He is not in acute distress.     Appearance: He is well-developed. He is not diaphoretic.   HENT:      Head: Normocephalic and atraumatic.      Mouth/Throat:      Pharynx: No oropharyngeal exudate.   Eyes:      Pupils: Pupils are equal, round, and reactive to light.   Cardiovascular:      Rate and Rhythm: Normal rate and regular rhythm.      Heart sounds: Normal heart sounds. No murmur heard.    No friction rub. No gallop.   Pulmonary:      Effort: Pulmonary effort is normal. No respiratory distress.      Breath sounds: Normal breath sounds. No wheezing or rales.   Chest:      Chest wall: No tenderness.   Musculoskeletal:      Right hand: Tenderness present. Decreased range of motion.        Arms:    Neurological:      Mental Status: He is alert and oriented to person, place, and time.   Psychiatric:         Behavior: Behavior normal.         Thought Content: Thought content normal.         Judgment: Judgment normal.                             Assessment & Plan        1. Right hand pain    - DX-HAND 3+ RIGHT; Future  - Referral to Hand Surgery    2. Unilateral complete BKA, right, subsequent " encounter ([facility])      3. Obesity (BMI 30-39.9)      4. Other chronic pain      5. Well adult exam    - Lipid Profile; Future  - Comp Metabolic Panel; Future

## 2022-08-04 DIAGNOSIS — B35.4 TINEA CORPORIS: ICD-10-CM

## 2022-08-04 DIAGNOSIS — M65.30 TRIGGER FINGER OF LEFT HAND, UNSPECIFIED FINGER: ICD-10-CM

## 2022-08-04 RX ORDER — CLOTRIMAZOLE AND BETAMETHASONE DIPROPIONATE 10; .64 MG/G; MG/G
CREAM TOPICAL
Qty: 15 G | Refills: 2 | Status: SHIPPED | OUTPATIENT
Start: 2022-08-04 | End: 2023-04-10 | Stop reason: SDUPTHER

## 2023-04-10 ENCOUNTER — OFFICE VISIT (OUTPATIENT)
Dept: MEDICAL GROUP | Facility: PHYSICIAN GROUP | Age: 48
End: 2023-04-10
Payer: COMMERCIAL

## 2023-04-10 VITALS
SYSTOLIC BLOOD PRESSURE: 140 MMHG | DIASTOLIC BLOOD PRESSURE: 60 MMHG | BODY MASS INDEX: 35.36 KG/M2 | WEIGHT: 247 LBS | OXYGEN SATURATION: 95 % | HEART RATE: 80 BPM | HEIGHT: 70 IN | TEMPERATURE: 97 F | RESPIRATION RATE: 16 BRPM

## 2023-04-10 DIAGNOSIS — Z12.11 SCREENING FOR COLORECTAL CANCER: ICD-10-CM

## 2023-04-10 DIAGNOSIS — B35.4 TINEA CORPORIS: ICD-10-CM

## 2023-04-10 DIAGNOSIS — Z12.12 SCREENING FOR COLORECTAL CANCER: ICD-10-CM

## 2023-04-10 DIAGNOSIS — Z72.0 TOBACCO ABUSE: ICD-10-CM

## 2023-04-10 PROCEDURE — 99214 OFFICE O/P EST MOD 30 MIN: CPT | Performed by: FAMILY MEDICINE

## 2023-04-10 RX ORDER — BUPROPION HYDROCHLORIDE 150 MG/1
150 TABLET ORAL EVERY MORNING
Qty: 30 TABLET | Refills: 3 | Status: SHIPPED | OUTPATIENT
Start: 2023-04-10 | End: 2023-08-24

## 2023-04-10 RX ORDER — CLOTRIMAZOLE AND BETAMETHASONE DIPROPIONATE 10; .64 MG/G; MG/G
CREAM TOPICAL
Qty: 15 G | Refills: 2 | Status: SHIPPED | OUTPATIENT
Start: 2023-04-10

## 2023-04-10 RX ORDER — CEPHALEXIN 500 MG/1
500 CAPSULE ORAL 4 TIMES DAILY
Qty: 40 CAPSULE | Refills: 0 | Status: SHIPPED | OUTPATIENT
Start: 2023-04-10

## 2023-04-10 RX ORDER — HYDROCODONE BITARTRATE AND ACETAMINOPHEN 10; 325 MG/1; MG/1
TABLET ORAL
COMMUNITY
Start: 2023-02-03 | End: 2023-04-10

## 2023-04-10 ASSESSMENT — ENCOUNTER SYMPTOMS
HEADACHES: 0
FEVER: 0
MUSCULOSKELETAL NEGATIVE: 1
DEPRESSION: 0
BLURRED VISION: 0
COUGH: 0
PSYCHIATRIC NEGATIVE: 1
MYALGIAS: 0
PALPITATIONS: 0
DIZZINESS: 0
HEMOPTYSIS: 0
DOUBLE VISION: 0
EYES NEGATIVE: 1
BRUISES/BLEEDS EASILY: 0
GASTROINTESTINAL NEGATIVE: 1
CONSTITUTIONAL NEGATIVE: 1
NAUSEA: 0
HEARTBURN: 0
TINGLING: 0
RESPIRATORY NEGATIVE: 1
CHILLS: 0
NEUROLOGICAL NEGATIVE: 1
CARDIOVASCULAR NEGATIVE: 1

## 2023-04-10 ASSESSMENT — PATIENT HEALTH QUESTIONNAIRE - PHQ9: CLINICAL INTERPRETATION OF PHQ2 SCORE: 0

## 2023-04-10 NOTE — PROGRESS NOTES
Subjective     Telly Solomon is a 48 y.o. male who presents with Annual Exam            1. Screening for colorectal cancer     Referral to GI for Colonoscopy   HEMOGLOBIN A1C; Future   Lipid Profile; Future   CBC WITHOUT DIFFERENTIAL; Future   Basic Metabolic Panel; Future    2. Tinea corporis  Itchy rash on legs and back   clotrimazole-betamethasone (LOTRISONE) 1-0.05 % Cream; GENERIC LOTRISONE APPLY ONE GRAM TO AFFECTED AREA(S) TWO TIMES A DAY  Dispense: 15 g; Refill: 2   HEMOGLOBIN A1C; Future   Lipid Profile; Future   CBC WITHOUT DIFFERENTIAL; Future   Basic Metabolic Panel; Future    3. Tobacco abuse  Patient is still currently using tobacco. They were counseled on the importance of cessation and various behavioural and pharmacological ways of achieving this.  UNCONTROLLED     buPROPion (WELLBUTRIN XL) 150 MG XL tablet; Take 1 Tablet by mouth every morning.  Dispense: 30 Tablet; Refill: 3   HEMOGLOBIN A1C; Future   Lipid Profile; Future   CBC WITHOUT DIFFERENTIAL; Future   Basic Metabolic Panel; Future    Past Medical History:  No date: Heart burn  04-15-13: Pain      Comment:  back, right leg, 3-4/10  No date: Snoring  Past Surgical History:  5/7/2013: IRRIGATION & DEBRIDEMENT ORTHO      Comment:  Performed by Elvis Low M.D. at SURGERY [facility] ORS  5/7/2013: WOUND CLOSURE ORTHO      Comment:  Performed by Elvis Low M.D. at SURGERY [facility] ORS  4/17/2013: KNEE AMPUTATION BELOW      Comment:  Performed by Elvis Low M.D. at SURGERY SAME DAY               [facility] ORS  6/29/2012: KNEE AMPUTATION BELOW      Comment:  Performed by ELVIS LOW at SURGERY [facility]                ORS  6/25/2012: IRRIGATION & DEBRIDEMENT GENERAL      Comment:  Performed by JEANETTE DONALDSON JR at SURGERY [facility] ORS  6/25/2012: FLAP FREE      Comment:  Performed by JEANETTE DONALDSON JR at SURGERY [facility]  "ORS  6/25/2012: SPLIT THICKNESS SKIN GRAFT      Comment:  Performed by JEANETTE DONALDSON JR at SURGERY [facility] ORS  6/25/2012: FLAP FREE      Comment:  Performed by JEANETTE DONALDSON JR at SURGERY [facility] ORS  6/24/2012: ORIF, FRACTURE, TIBIA      Comment:  Performed by MERLINE MARTIN at SURGERY [facility]                ORS  6/24/2012: IRRIGATION & DEBRIDEMENT ORTHO      Comment:  Performed by MERLINE MARTIN at SURGERY [facility]                ORS  6/19/2012: IRRIGATION & DEBRIDEMENT ORTHO      Comment:  Performed by MERLINE MARTIN at SURGERY [facility]                ORS  6/17/2012: INCISION AND DRAINAGE ORTHOPEDIC      Comment:  Performed by MERLINE MARTIN at SURGERY [facility]                ORS  6/17/2012: EXTERNAL FIXATOR APPLICATION      Comment:  Performed by MERLINE MARTIN at SURGERY [facility]                ORS  6/15/2012: EXTERNAL FIXATOR APPLICATION      Comment:  Performed by JAYLA LARIOS at SURGERY [facility] ORS  6/15/2012: INCISION AND DRAINAGE ORTHOPEDIC      Comment:  Performed by JAYLA LARIOS at SURGERY [facility] ORS  6/15/2012: CHEST TUBE INSERTION      Comment:  Performed by JAYLA LARIOS at SURGERY [facility] ORS  Social History    Tobacco Use      Smoking status: Some Days        Types: Cigarettes      Smokeless tobacco: Never      Tobacco comments: \"about 3-4 cigarettes per day\"    Alcohol use: No    Drug use: No    History reviewed.  No pertinent family history.      Current Outpatient Medications: ·  clotrimazole-betamethasone (LOTRISONE) 1-0.05 % Cream, GENERIC LOTRISONE APPLY ONE GRAM TO AFFECTED AREA(S) TWO TIMES A DAY, Disp: 15 g, Rfl: 2·  buPROPion (WELLBUTRIN XL) 150 MG XL tablet, Take 1 Tablet by mouth every morning., Disp: 30 Tablet, Rfl: 3·  cephALEXin (KEFLEX) 500 MG Cap, Take 1 Capsule by mouth 4 times a day., Disp: 40 Capsule, Rfl: 0·  HYDROcodone-acetaminophen (NORCO) 5-325 MG Tab per tablet, Take 1-2 Tabs by mouth " "every four hours as needed (1 PO Daily PRN)., Disp: , Rfl:     Patient was instructed on the use of medications, either prescriptions or OTC and informed on when the appropriate follow up time period should be. In addition, patient was also instructed that should any acute worsening occur that they should notify this clinic asap or call 911.            Review of Systems   Constitutional: Negative.  Negative for chills and fever.   HENT: Negative.  Negative for hearing loss.    Eyes: Negative.  Negative for blurred vision and double vision.   Respiratory: Negative.  Negative for cough and hemoptysis.    Cardiovascular: Negative.  Negative for chest pain and palpitations.   Gastrointestinal: Negative.  Negative for heartburn and nausea.   Genitourinary: Negative.  Negative for dysuria.   Musculoskeletal: Negative.  Negative for myalgias.   Skin:  Positive for itching and rash.   Neurological: Negative.  Negative for dizziness, tingling and headaches.   Endo/Heme/Allergies: Negative.  Does not bruise/bleed easily.   Psychiatric/Behavioral: Negative.  Negative for depression and suicidal ideas.    All other systems reviewed and are negative.           Objective     BP (!) 140/60 (BP Location: Left arm, Patient Position: Sitting, BP Cuff Size: Large adult)   Pulse 80   Temp 36.1 °C (97 °F) (Temporal)   Resp 16   Ht 1.778 m (5' 10\")   Wt 112 kg (247 lb)   SpO2 95%   BMI 35.44 kg/m²      Physical Exam  Vitals and nursing note reviewed.   Constitutional:       General: He is not in acute distress.     Appearance: He is well-developed. He is not diaphoretic.   HENT:      Head: Normocephalic and atraumatic.      Mouth/Throat:      Pharynx: No oropharyngeal exudate.   Eyes:      Pupils: Pupils are equal, round, and reactive to light.   Cardiovascular:      Rate and Rhythm: Normal rate and regular rhythm.      Heart sounds: Normal heart sounds. No murmur heard.    No friction rub. No gallop.   Pulmonary:      Effort: " Pulmonary effort is normal. No respiratory distress.      Breath sounds: Normal breath sounds. No wheezing or rales.   Chest:      Chest wall: No tenderness.   Skin:     Findings: Rash present. Rash is macular.          Neurological:      Mental Status: He is alert and oriented to person, place, and time.   Psychiatric:         Behavior: Behavior normal.         Thought Content: Thought content normal.         Judgment: Judgment normal.                           Assessment & Plan        1. Screening for colorectal cancer    - Referral to GI for Colonoscopy  - HEMOGLOBIN A1C; Future  - Lipid Profile; Future  - CBC WITHOUT DIFFERENTIAL; Future  - Basic Metabolic Panel; Future    2. Tinea corporis    - clotrimazole-betamethasone (LOTRISONE) 1-0.05 % Cream; GENERIC LOTRISONE APPLY ONE GRAM TO AFFECTED AREA(S) TWO TIMES A DAY  Dispense: 15 g; Refill: 2  - HEMOGLOBIN A1C; Future  - Lipid Profile; Future  - CBC WITHOUT DIFFERENTIAL; Future  - Basic Metabolic Panel; Future    3. Tobacco abuse    - buPROPion (WELLBUTRIN XL) 150 MG XL tablet; Take 1 Tablet by mouth every morning.  Dispense: 30 Tablet; Refill: 3  - HEMOGLOBIN A1C; Future  - Lipid Profile; Future  - CBC WITHOUT DIFFERENTIAL; Future  - Basic Metabolic Panel; Future

## 2023-06-01 LAB — HBA1C MFR BLD: 5.8 % (ref ?–5.8)

## 2023-06-05 ENCOUNTER — TELEPHONE (OUTPATIENT)
Dept: MEDICAL GROUP | Facility: PHYSICIAN GROUP | Age: 48
End: 2023-06-05
Payer: COMMERCIAL

## 2023-06-05 NOTE — TELEPHONE ENCOUNTER
----- Message from Hayder Lal M.D. sent at 6/5/2023  7:18 AM PDT -----  This patient needs an appointment within the next week. Please schedule this with the patient so we may discuss their lab results

## 2023-06-07 ENCOUNTER — OFFICE VISIT (OUTPATIENT)
Dept: MEDICAL GROUP | Facility: PHYSICIAN GROUP | Age: 48
End: 2023-06-07
Payer: COMMERCIAL

## 2023-06-07 VITALS
SYSTOLIC BLOOD PRESSURE: 130 MMHG | BODY MASS INDEX: 34.99 KG/M2 | RESPIRATION RATE: 16 BRPM | TEMPERATURE: 98.4 F | WEIGHT: 244.4 LBS | HEIGHT: 70 IN | DIASTOLIC BLOOD PRESSURE: 78 MMHG | OXYGEN SATURATION: 96 % | HEART RATE: 72 BPM

## 2023-06-07 DIAGNOSIS — E74.39 GLUCOSE INTOLERANCE: ICD-10-CM

## 2023-06-07 DIAGNOSIS — S16.1XXA STRAIN OF NECK MUSCLE, INITIAL ENCOUNTER: ICD-10-CM

## 2023-06-07 DIAGNOSIS — E66.01 MORBID OBESITY (HCC): ICD-10-CM

## 2023-06-07 PROCEDURE — 99214 OFFICE O/P EST MOD 30 MIN: CPT | Performed by: FAMILY MEDICINE

## 2023-06-07 PROCEDURE — 3075F SYST BP GE 130 - 139MM HG: CPT | Performed by: FAMILY MEDICINE

## 2023-06-07 PROCEDURE — 3078F DIAST BP <80 MM HG: CPT | Performed by: FAMILY MEDICINE

## 2023-06-07 RX ORDER — TIZANIDINE 4 MG/1
4 TABLET ORAL EVERY 6 HOURS PRN
Qty: 30 TABLET | Refills: 3 | Status: SHIPPED | OUTPATIENT
Start: 2023-06-07

## 2023-06-07 ASSESSMENT — ENCOUNTER SYMPTOMS
PSYCHIATRIC NEGATIVE: 1
COUGH: 0
PALPITATIONS: 0
HEADACHES: 0
NAUSEA: 0
NECK PAIN: 1
RESPIRATORY NEGATIVE: 1
DOUBLE VISION: 0
NEUROLOGICAL NEGATIVE: 1
FEVER: 0
BLURRED VISION: 0
BRUISES/BLEEDS EASILY: 0
DIZZINESS: 0
HEMOPTYSIS: 0
GASTROINTESTINAL NEGATIVE: 1
CARDIOVASCULAR NEGATIVE: 1
CHILLS: 0
MYALGIAS: 0
HEARTBURN: 0
EYES NEGATIVE: 1
TINGLING: 0
CONSTITUTIONAL NEGATIVE: 1
DEPRESSION: 0
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
## 2023-08-24 DIAGNOSIS — Z72.0 TOBACCO ABUSE: ICD-10-CM

## 2023-08-24 RX ORDER — BUPROPION HYDROCHLORIDE 150 MG/1
150 TABLET ORAL EVERY MORNING
Qty: 30 TABLET | Refills: 3 | Status: SHIPPED | OUTPATIENT
Start: 2023-08-24

## 2023-09-06 ENCOUNTER — OFFICE VISIT (OUTPATIENT)
Dept: MEDICAL GROUP | Facility: PHYSICIAN GROUP | Age: 48
End: 2023-09-06
Payer: COMMERCIAL

## 2023-09-06 VITALS
BODY MASS INDEX: 34.22 KG/M2 | OXYGEN SATURATION: 96 % | TEMPERATURE: 98.2 F | HEIGHT: 70 IN | SYSTOLIC BLOOD PRESSURE: 140 MMHG | HEART RATE: 65 BPM | WEIGHT: 239 LBS | DIASTOLIC BLOOD PRESSURE: 84 MMHG | RESPIRATION RATE: 12 BRPM

## 2023-09-06 DIAGNOSIS — Z12.12 SCREENING FOR COLORECTAL CANCER: ICD-10-CM

## 2023-09-06 DIAGNOSIS — Z72.0 TOBACCO ABUSE: ICD-10-CM

## 2023-09-06 DIAGNOSIS — Z12.11 SCREENING FOR COLORECTAL CANCER: ICD-10-CM

## 2023-09-06 DIAGNOSIS — E74.39 GLUCOSE INTOLERANCE: ICD-10-CM

## 2023-09-06 LAB
HBA1C MFR BLD: 5.8 % (ref ?–5.8)
POCT INT CON NEG: NEGATIVE
POCT INT CON POS: POSITIVE

## 2023-09-06 PROCEDURE — 83036 HEMOGLOBIN GLYCOSYLATED A1C: CPT | Performed by: FAMILY MEDICINE

## 2023-09-06 PROCEDURE — 3079F DIAST BP 80-89 MM HG: CPT | Performed by: FAMILY MEDICINE

## 2023-09-06 PROCEDURE — 99214 OFFICE O/P EST MOD 30 MIN: CPT | Performed by: FAMILY MEDICINE

## 2023-09-06 PROCEDURE — 3077F SYST BP >= 140 MM HG: CPT | Performed by: FAMILY MEDICINE

## 2023-09-06 RX ORDER — VARENICLINE TARTRATE
1 KIT 2 TIMES DAILY
Qty: 60 EACH | Refills: 3 | Status: SHIPPED | OUTPATIENT
Start: 2023-09-06

## 2023-09-06 ASSESSMENT — ENCOUNTER SYMPTOMS
CONSTITUTIONAL NEGATIVE: 1
MUSCULOSKELETAL NEGATIVE: 1
HEARTBURN: 0
MYALGIAS: 0
DIZZINESS: 0
NAUSEA: 0
CHILLS: 0
EYES NEGATIVE: 1
PSYCHIATRIC NEGATIVE: 1
RESPIRATORY NEGATIVE: 1
FEVER: 0
TINGLING: 0
BRUISES/BLEEDS EASILY: 0
DEPRESSION: 0
CARDIOVASCULAR NEGATIVE: 1
HEMOPTYSIS: 0
BLURRED VISION: 0
GASTROINTESTINAL NEGATIVE: 1
COUGH: 0
NEUROLOGICAL NEGATIVE: 1
DOUBLE VISION: 0
HEADACHES: 0
PALPITATIONS: 0
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
## 2024-11-25 ENCOUNTER — TELEMEDICINE (OUTPATIENT)
Dept: MEDICAL GROUP | Facility: PHYSICIAN GROUP | Age: 49
End: 2024-11-25
Payer: COMMERCIAL

## 2024-11-25 DIAGNOSIS — Z87.01 HISTORY OF PNEUMONIA: ICD-10-CM

## 2024-11-25 RX ORDER — HYDROCODONE BITARTRATE AND ACETAMINOPHEN 5; 325 MG/1; MG/1
TABLET ORAL
COMMUNITY

## 2024-11-25 RX ORDER — AZITHROMYCIN 250 MG/1
250 TABLET, FILM COATED ORAL DAILY
Qty: 6 TABLET | Refills: 0 | Status: SHIPPED | OUTPATIENT
Start: 2024-11-25

## 2024-11-25 RX ORDER — BENZONATATE 100 MG/1
100 CAPSULE ORAL 3 TIMES DAILY PRN
Qty: 60 CAPSULE | Refills: 0 | Status: SHIPPED | OUTPATIENT
Start: 2024-11-25

## 2024-11-25 RX ORDER — METHYLPREDNISOLONE 4 MG/1
TABLET ORAL
Qty: 21 TABLET | Refills: 0 | Status: SHIPPED | OUTPATIENT
Start: 2024-11-25

## 2024-11-25 ASSESSMENT — PATIENT HEALTH QUESTIONNAIRE - PHQ9: CLINICAL INTERPRETATION OF PHQ2 SCORE: 0
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
## 2024-12-12 DIAGNOSIS — Z87.01 HISTORY OF PNEUMONIA: ICD-10-CM

## 2024-12-12 RX ORDER — AZITHROMYCIN 250 MG/1
250 TABLET, FILM COATED ORAL DAILY
Qty: 6 TABLET | Refills: 0 | Status: SHIPPED | OUTPATIENT
Start: 2024-12-12

## 2024-12-12 NOTE — TELEPHONE ENCOUNTER
Received request via: Patient    Was the patient seen in the last year in this department? Yes    Does the patient have an active prescription (recently filled or refills available) for medication(s) requested? No    Pharmacy Name: Pharmacy:   [facility] [id number] - [place], [place] - [address]     Does the patient have long term Plus and need 100-day supply? (This applies to ALL medications) Patient does not have SCP

## 2024-12-13 ENCOUNTER — TELEPHONE (OUTPATIENT)
Dept: MEDICAL GROUP | Facility: PHYSICIAN GROUP | Age: 49
End: 2024-12-13
Payer: COMMERCIAL

## 2025-01-15 ENCOUNTER — OFFICE VISIT (OUTPATIENT)
Dept: MEDICAL GROUP | Facility: PHYSICIAN GROUP | Age: 50
End: 2025-01-15

## 2025-01-15 VITALS
OXYGEN SATURATION: 95 % | RESPIRATION RATE: 18 BRPM | HEIGHT: 70 IN | TEMPERATURE: 97 F | WEIGHT: 203 LBS | BODY MASS INDEX: 29.06 KG/M2 | SYSTOLIC BLOOD PRESSURE: 148 MMHG | HEART RATE: 103 BPM | DIASTOLIC BLOOD PRESSURE: 86 MMHG

## 2025-01-15 DIAGNOSIS — Z72.0 TOBACCO ABUSE: ICD-10-CM

## 2025-01-15 DIAGNOSIS — Z87.01 HISTORY OF PNEUMONIA: ICD-10-CM

## 2025-01-15 DIAGNOSIS — M54.16 LUMBAR RADICULOPATHY: ICD-10-CM

## 2025-01-15 PROCEDURE — 3077F SYST BP >= 140 MM HG: CPT | Performed by: FAMILY MEDICINE

## 2025-01-15 PROCEDURE — 99214 OFFICE O/P EST MOD 30 MIN: CPT | Performed by: FAMILY MEDICINE

## 2025-01-15 PROCEDURE — 3079F DIAST BP 80-89 MM HG: CPT | Performed by: FAMILY MEDICINE

## 2025-01-15 RX ORDER — ALBUTEROL SULFATE 90 UG/1
2 INHALANT RESPIRATORY (INHALATION) EVERY 4 HOURS PRN
Qty: 1 EACH | Refills: 3 | Status: SHIPPED | OUTPATIENT
Start: 2025-01-15

## 2025-01-15 RX ORDER — METHYLPREDNISOLONE 4 MG/1
TABLET ORAL
Qty: 21 TABLET | Refills: 0 | Status: SHIPPED | OUTPATIENT
Start: 2025-01-15

## 2025-01-15 RX ORDER — CYCLOBENZAPRINE HCL 5 MG
5 TABLET ORAL 3 TIMES DAILY PRN
Qty: 30 TABLET | Refills: 0 | Status: SHIPPED | OUTPATIENT
Start: 2025-01-15

## 2025-01-15 ASSESSMENT — ENCOUNTER SYMPTOMS
GASTROINTESTINAL NEGATIVE: 1
DIZZINESS: 0
PALPITATIONS: 0
DEPRESSION: 0
CARDIOVASCULAR NEGATIVE: 1
HEMOPTYSIS: 0
NEUROLOGICAL NEGATIVE: 1
NAUSEA: 0
FEVER: 0
DOUBLE VISION: 0
EYES NEGATIVE: 1
TINGLING: 0
CHILLS: 0
COUGH: 1
CONSTITUTIONAL NEGATIVE: 1
BLURRED VISION: 0
WHEEZING: 1
BRUISES/BLEEDS EASILY: 0
PSYCHIATRIC NEGATIVE: 1
BACK PAIN: 1
MYALGIAS: 0
HEARTBURN: 0
HEADACHES: 0

## 2025-01-15 NOTE — PROGRESS NOTES
Subjective     Telly Solomon is a 50 y.o. male who presents with Follow-Up (Pneumonia, back pain )            Recent bout of pna and has continued cough at times and wheezing  Still smoking  On exam few scattered wheezes present  Will do medrol dosepack and mdi  Also with acute left lower back pain from coughing   Steroids will help that also and additionally muscle relaxer    1. Tobacco abuse  Patient is still currently using tobacco. They were counseled on the importance of cessation and various behavioural and pharmacological ways of achieving this.  UNCONTROLLED      2. History of pneumonia     methylPREDNISolone (MEDROL DOSEPAK) 4 MG Tablet Therapy Pack; As directed on the packaging label.  Dispense: 21 Tablet; Refill: 0   albuterol 108 (90 Base) MCG/ACT Aero Soln inhalation aerosol; Inhale 2 Puffs every four hours as needed for Shortness of Breath.  Dispense: 1 Each; Refill: 3    3. Lumbar radiculopathy     cyclobenzaprine (FLEXERIL) 5 mg tablet; Take 1 Tablet by mouth 3 times a day as needed for Moderate Pain.  Dispense: 30 Tablet; Refill: 0    Past Medical History:  No date: Heart burn  04-15-13: Pain      Comment:  back, right leg, 3-4/10  No date: Snoring  Past Surgical History:  5/7/2013: IRRIGATION & DEBRIDEMENT ORTHO      Comment:  Performed by Elvis Low M.D. at SURGERY [facility] ORS  5/7/2013: WOUND CLOSURE ORTHO      Comment:  Performed by Elvis Low M.D. at SURGERY [facility] ORS  4/17/2013: KNEE AMPUTATION BELOW      Comment:  Performed by Elvis Low M.D. at SURGERY SAME DAY               [facility] ORS  6/29/2012: KNEE AMPUTATION BELOW      Comment:  Performed by ELVIS LOW at SURGERY [facility]                ORS  6/25/2012: IRRIGATION & DEBRIDEMENT GENERAL      Comment:  Performed by JEANETTE DONALDSON JR at SURGERY [facility] ORS  6/25/2012: FLAP FREE      Comment:  Performed by JEANETTE DONALDSON JR at  "SURGERY [facility] ORS  6/25/2012: SPLIT THICKNESS SKIN GRAFT      Comment:  Performed by JEANETTE DONALDSON JR at SURGERY [facility] ORS  6/25/2012: FLAP FREE      Comment:  Performed by JEANETTE DONALDSON JR at SURGERY [facility] ORS  6/24/2012: ORIF, FRACTURE, TIBIA      Comment:  Performed by MERLINE MARTIN at SURGERY [facility]                ORS  6/24/2012: IRRIGATION & DEBRIDEMENT ORTHO      Comment:  Performed by EMRLINE MARTIN at SURGERY [facility]                ORS  6/19/2012: IRRIGATION & DEBRIDEMENT ORTHO      Comment:  Performed by MERLINE MARTIN at SURGERY [facility]                ORS  6/17/2012: INCISION AND DRAINAGE ORTHOPEDIC      Comment:  Performed by MERLINE MARTIN at SURGERY [facility]                ORS  6/17/2012: EXTERNAL FIXATOR APPLICATION      Comment:  Performed by MERLINE MARTIN at SURGERY [facility]  6/15/2012: EXTERNAL FIXATOR APPLICATION      Comment:  Performed by JAYLA LARIOS at SURGERY [facility] ORS  6/15/2012: INCISION AND DRAINAGE ORTHOPEDIC      Comment:  Performed by JAYLA LARIOS at SURGERY [facility]  6/15/2012: CHEST TUBE INSERTION      Comment:  Performed by JAYLA LARIOS at SURGERY [facility]  Social History    Tobacco Use      Smoking status: Some Days        Types: Cigarettes      Smokeless tobacco: Never      Tobacco comments: \"about 3-4 cigarettes per day\"    Alcohol use: No    Drug use: No    No family history on file.      Current Outpatient Medications: ·  methylPREDNISolone (MEDROL DOSEPAK) 4 MG Tablet Therapy Pack, As directed on the packaging label., Disp: 21 Tablet, Rfl: 0·  albuterol 108 (90 Base) MCG/ACT Aero Soln inhalation aerosol, Inhale 2 Puffs every four hours as needed for Shortness of Breath., Disp: 1 Each, Rfl: 3·  cyclobenzaprine (FLEXERIL) 5 mg tablet, Take 1 Tablet by mouth 3 times a day as needed for Moderate Pain., Disp: 30 Tablet, Rfl: 0·  buPROPion " (WELLBUTRIN XL) 150 MG XL tablet, TAKE ONE TABLET BY MOUTH EVERY MORNING (Patient not taking: Reported on 11/25/2024), Disp: 30 Tablet, Rfl: 3·  HYDROcodone-acetaminophen (NORCO) 5-325 MG Tab per tablet, Take 1-2 Tabs by mouth every four hours as needed (1 PO Daily PRN)., Disp: , Rfl: ·  azithromycin (ZITHROMAX) 250 MG Tab, Take 1 Tablet by mouth every day. (Patient not taking: Reported on 1/15/2025), Disp: 6 Tablet, Rfl: 0·  HYDROcodone-acetaminophen (NORCO) 5-325 MG Tab per tablet, 90 (Patient not taking: Reported on 11/25/2024), Disp: , Rfl: ·  benzonatate (TESSALON) 100 MG Cap, Take 1 Capsule by mouth 3 times a day as needed for Cough. (Patient not taking: Reported on 1/15/2025), Disp: 60 Capsule, Rfl: 0·  Varenicline Tartrate, Starter, (CHANTIX STARTING MONTH PAK) 0.5 MG X 11 & 1 MG X 42 Tablet Therapy Pack, Take 1 Each by mouth 2 times a day. (Patient not taking: Reported on 11/25/2024), Disp: 60 Each, Rfl: 3·  tizanidine (ZANAFLEX) 4 MG Tab, Take 1 Tablet by mouth every 6 hours as needed (pain). (Patient not taking: Reported on 11/25/2024), Disp: 30 Tablet, Rfl: 3·  clotrimazole-betamethasone (LOTRISONE) 1-0.05 % Cream, GENERIC LOTRISONE APPLY ONE GRAM TO AFFECTED AREA(S) TWO TIMES A DAY (Patient not taking: Reported on 1/15/2025), Disp: 15 g, Rfl: 2·  cephALEXin (KEFLEX) 500 MG Cap, Take 1 Capsule by mouth 4 times a day., Disp: 40 Capsule, Rfl: 0    Patient was instructed on the use of medications, either prescriptions or OTC and informed on when the appropriate follow up time period should be. In addition, patient was also instructed that should any acute worsening occur that they should notify this clinic asap or call 911.              Review of Systems   Constitutional: Negative.  Negative for chills and fever.   HENT: Negative.  Negative for hearing loss.    Eyes: Negative.  Negative for blurred vision and double vision.   Respiratory:  Positive for cough and wheezing. Negative for hemoptysis.   "  Cardiovascular: Negative.  Negative for chest pain and palpitations.   Gastrointestinal: Negative.  Negative for heartburn and nausea.   Genitourinary: Negative.  Negative for dysuria.   Musculoskeletal:  Positive for back pain. Negative for myalgias.   Skin: Negative.  Negative for rash.   Neurological: Negative.  Negative for dizziness, tingling and headaches.   Endo/Heme/Allergies: Negative.  Does not bruise/bleed easily.   Psychiatric/Behavioral: Negative.  Negative for depression and suicidal ideas.    All other systems reviewed and are negative.             Objective     BP (!) 148/86   Pulse (!) 103   Temp 36.1 °C (97 °F) (Temporal)   Resp 18   Ht 1.778 m (5' 10\")   Wt 92.1 kg (203 lb)   SpO2 95%   BMI 29.13 kg/m²      Physical Exam  Vitals and nursing note reviewed.   Constitutional:       General: He is not in acute distress.     Appearance: He is well-developed. He is not diaphoretic.   HENT:      Head: Normocephalic and atraumatic.      Mouth/Throat:      Pharynx: No oropharyngeal exudate.   Eyes:      Pupils: Pupils are equal, round, and reactive to light.   Cardiovascular:      Rate and Rhythm: Normal rate and regular rhythm.      Heart sounds: Normal heart sounds. No murmur heard.     No friction rub. No gallop.   Pulmonary:      Effort: Pulmonary effort is normal. No respiratory distress.      Breath sounds: Wheezing present. No rales.   Chest:      Chest wall: No tenderness.   Musculoskeletal:      Lumbar back: Negative right straight leg raise test and negative left straight leg raise test.   Neurological:      Mental Status: He is alert and oriented to person, place, and time.   Psychiatric:         Behavior: Behavior normal.         Thought Content: Thought content normal.         Judgment: Judgment normal.                             Assessment & Plan        Assessment & Plan  Tobacco abuse         History of pneumonia    Orders:    methylPREDNISolone (MEDROL DOSEPAK) 4 MG Tablet Therapy " Pack; As directed on the packaging label.    albuterol 108 (90 Base) MCG/ACT Aero Soln inhalation aerosol; Inhale 2 Puffs every four hours as needed for Shortness of Breath.    Lumbar radiculopathy    Orders:    cyclobenzaprine (FLEXERIL) 5 mg tablet; Take 1 Tablet by mouth 3 times a day as needed for Moderate Pain.

## 2025-01-27 RX ORDER — GABAPENTIN 100 MG/1
100 CAPSULE ORAL 3 TIMES DAILY
Qty: 90 CAPSULE | Refills: 2 | Status: SHIPPED | OUTPATIENT
Start: 2025-01-27

## 2025-02-12 ENCOUNTER — HOSPITAL ENCOUNTER (INPATIENT)
Facility: MEDICAL CENTER | Age: 50
LOS: 3 days | DRG: 420 | End: 2025-02-15
Attending: EMERGENCY MEDICINE | Admitting: STUDENT IN AN ORGANIZED HEALTH CARE EDUCATION/TRAINING PROGRAM
Payer: COMMERCIAL

## 2025-02-12 ENCOUNTER — APPOINTMENT (OUTPATIENT)
Dept: RADIOLOGY | Facility: MEDICAL CENTER | Age: 50
DRG: 420 | End: 2025-02-12
Attending: EMERGENCY MEDICINE
Payer: COMMERCIAL

## 2025-02-12 ENCOUNTER — APPOINTMENT (OUTPATIENT)
Dept: RADIOLOGY | Facility: MEDICAL CENTER | Age: 50
DRG: 420 | End: 2025-02-12
Attending: NURSE PRACTITIONER
Payer: COMMERCIAL

## 2025-02-12 DIAGNOSIS — R91.8 HILAR MASS: ICD-10-CM

## 2025-02-12 DIAGNOSIS — R17 JAUNDICE: ICD-10-CM

## 2025-02-12 DIAGNOSIS — R74.01 TRANSAMINITIS: ICD-10-CM

## 2025-02-12 DIAGNOSIS — K86.89 PANCREATIC MASS: ICD-10-CM

## 2025-02-12 PROBLEM — S32.000A LUMBAR COMPRESSION FRACTURE, CLOSED, INITIAL ENCOUNTER (HCC): Status: ACTIVE | Noted: 2025-02-12

## 2025-02-12 PROBLEM — K85.90 ACUTE PANCREATITIS: Status: ACTIVE | Noted: 2025-02-12

## 2025-02-12 PROBLEM — D72.829 LEUKOCYTOSIS: Status: ACTIVE | Noted: 2025-02-12

## 2025-02-12 PROBLEM — Z71.6 TOBACCO ABUSE COUNSELING: Status: ACTIVE | Noted: 2025-02-12

## 2025-02-12 PROBLEM — E83.52 HYPERCALCEMIA: Status: ACTIVE | Noted: 2025-02-12

## 2025-02-12 LAB
ALBUMIN SERPL BCP-MCNC: 3.2 G/DL (ref 3.2–4.9)
ALBUMIN/GLOB SERPL: 0.9 G/DL
ALP SERPL-CCNC: 687 U/L (ref 30–99)
ALT SERPL-CCNC: 278 U/L (ref 2–50)
ANION GAP SERPL CALC-SCNC: 13 MMOL/L (ref 7–16)
APPEARANCE UR: CLEAR
AST SERPL-CCNC: 277 U/L (ref 12–45)
BASOPHILS # BLD AUTO: 0 % (ref 0–1.8)
BASOPHILS # BLD: 0 K/UL (ref 0–0.12)
BILIRUB SERPL-MCNC: 9.4 MG/DL (ref 0.1–1.5)
BILIRUB UR QL STRIP.AUTO: ABNORMAL
BUN SERPL-MCNC: 19 MG/DL (ref 8–22)
CALCIUM ALBUM COR SERPL-MCNC: 10.6 MG/DL (ref 8.5–10.5)
CALCIUM SERPL-MCNC: 10 MG/DL (ref 8.5–10.5)
CHLORIDE SERPL-SCNC: 102 MMOL/L (ref 96–112)
CO2 SERPL-SCNC: 21 MMOL/L (ref 20–33)
COLOR UR: ABNORMAL
COMMENT NL1176: NORMAL
CREAT SERPL-MCNC: 0.74 MG/DL (ref 0.5–1.4)
EOSINOPHIL # BLD AUTO: 0.09 K/UL (ref 0–0.51)
EOSINOPHIL NFR BLD: 0.8 % (ref 0–6.9)
ERYTHROCYTE [DISTWIDTH] IN BLOOD BY AUTOMATED COUNT: 51.1 FL (ref 35.9–50)
GFR SERPLBLD CREATININE-BSD FMLA CKD-EPI: 109 ML/MIN/1.73 M 2
GLOBULIN SER CALC-MCNC: 3.4 G/DL (ref 1.9–3.5)
GLUCOSE SERPL-MCNC: 99 MG/DL (ref 65–99)
GLUCOSE UR STRIP.AUTO-MCNC: NEGATIVE MG/DL
HCT VFR BLD AUTO: 40.6 % (ref 42–52)
HGB BLD-MCNC: 14.5 G/DL (ref 14–18)
KETONES UR STRIP.AUTO-MCNC: ABNORMAL MG/DL
LEUKOCYTE ESTERASE UR QL STRIP.AUTO: NEGATIVE
LIPASE SERPL-CCNC: 755 U/L (ref 11–82)
LYMPHOCYTES # BLD AUTO: 4.7 K/UL (ref 1–4.8)
LYMPHOCYTES NFR BLD: 40.2 % (ref 22–41)
MANUAL DIFF BLD: NORMAL
MCH RBC QN AUTO: 29.5 PG (ref 27–33)
MCHC RBC AUTO-ENTMCNC: 35.7 G/DL (ref 32.3–36.5)
MCV RBC AUTO: 82.7 FL (ref 81.4–97.8)
MICRO URNS: ABNORMAL
MONOCYTES # BLD AUTO: 1.21 K/UL (ref 0–0.85)
MONOCYTES NFR BLD AUTO: 10.3 % (ref 0–13.4)
MORPHOLOGY BLD-IMP: NORMAL
NEUTROPHILS # BLD AUTO: 5.7 K/UL (ref 1.82–7.42)
NEUTROPHILS NFR BLD: 43.6 % (ref 44–72)
NEUTS BAND NFR BLD MANUAL: 5.1 % (ref 0–10)
NITRITE UR QL STRIP.AUTO: NEGATIVE
NRBC # BLD AUTO: 0.64 K/UL
NRBC BLD-RTO: 5.5 /100 WBC (ref 0–0.2)
NT-PROBNP SERPL IA-MCNC: 166 PG/ML (ref 0–125)
PH UR STRIP.AUTO: 5.5 [PH] (ref 5–8)
PLATELET # BLD AUTO: 99 K/UL (ref 164–446)
PLATELET BLD QL SMEAR: NORMAL
PLATELETS.RETICULATED NFR BLD AUTO: 4.3 % (ref 0.6–13.1)
PMV BLD AUTO: 10.5 FL (ref 9–12.9)
POIKILOCYTOSIS BLD QL SMEAR: NORMAL
POTASSIUM SERPL-SCNC: 4.6 MMOL/L (ref 3.6–5.5)
PROT SERPL-MCNC: 6.6 G/DL (ref 6–8.2)
PROT UR QL STRIP: NEGATIVE MG/DL
RBC # BLD AUTO: 4.91 M/UL (ref 4.7–6.1)
RBC BLD AUTO: PRESENT
RBC UR QL AUTO: NEGATIVE
SMUDGE CELLS BLD QL SMEAR: NORMAL
SODIUM SERPL-SCNC: 136 MMOL/L (ref 135–145)
SP GR UR STRIP.AUTO: >1.045
TARGETS BLD QL SMEAR: NORMAL
TROPONIN T SERPL-MCNC: 13 NG/L (ref 6–19)
UROBILINOGEN UR STRIP.AUTO-MCNC: 1 EU/DL
WBC # BLD AUTO: 11.7 K/UL (ref 4.8–10.8)

## 2025-02-12 PROCEDURE — 700117 HCHG RX CONTRAST REV CODE 255: Performed by: EMERGENCY MEDICINE

## 2025-02-12 PROCEDURE — 71250 CT THORAX DX C-: CPT

## 2025-02-12 PROCEDURE — 96374 THER/PROPH/DIAG INJ IV PUSH: CPT

## 2025-02-12 PROCEDURE — 84484 ASSAY OF TROPONIN QUANT: CPT

## 2025-02-12 PROCEDURE — 700105 HCHG RX REV CODE 258: Performed by: STUDENT IN AN ORGANIZED HEALTH CARE EDUCATION/TRAINING PROGRAM

## 2025-02-12 PROCEDURE — 99221 1ST HOSP IP/OBS SF/LOW 40: CPT | Mod: FS | Performed by: SURGERY

## 2025-02-12 PROCEDURE — 770001 HCHG ROOM/CARE - MED/SURG/GYN PRIV*

## 2025-02-12 PROCEDURE — 99285 EMERGENCY DEPT VISIT HI MDM: CPT

## 2025-02-12 PROCEDURE — 83690 ASSAY OF LIPASE: CPT

## 2025-02-12 PROCEDURE — 83880 ASSAY OF NATRIURETIC PEPTIDE: CPT

## 2025-02-12 PROCEDURE — 93005 ELECTROCARDIOGRAM TRACING: CPT | Mod: TC | Performed by: EMERGENCY MEDICINE

## 2025-02-12 PROCEDURE — 36415 COLL VENOUS BLD VENIPUNCTURE: CPT

## 2025-02-12 PROCEDURE — 700102 HCHG RX REV CODE 250 W/ 637 OVERRIDE(OP): Performed by: STUDENT IN AN ORGANIZED HEALTH CARE EDUCATION/TRAINING PROGRAM

## 2025-02-12 PROCEDURE — 72131 CT LUMBAR SPINE W/O DYE: CPT

## 2025-02-12 PROCEDURE — 85007 BL SMEAR W/DIFF WBC COUNT: CPT

## 2025-02-12 PROCEDURE — 85055 RETICULATED PLATELET ASSAY: CPT

## 2025-02-12 PROCEDURE — 74177 CT ABD & PELVIS W/CONTRAST: CPT

## 2025-02-12 PROCEDURE — A9270 NON-COVERED ITEM OR SERVICE: HCPCS | Performed by: STUDENT IN AN ORGANIZED HEALTH CARE EDUCATION/TRAINING PROGRAM

## 2025-02-12 PROCEDURE — 700105 HCHG RX REV CODE 258: Performed by: EMERGENCY MEDICINE

## 2025-02-12 PROCEDURE — 99223 1ST HOSP IP/OBS HIGH 75: CPT | Mod: 25 | Performed by: STUDENT IN AN ORGANIZED HEALTH CARE EDUCATION/TRAINING PROGRAM

## 2025-02-12 PROCEDURE — 700111 HCHG RX REV CODE 636 W/ 250 OVERRIDE (IP): Mod: JZ | Performed by: EMERGENCY MEDICINE

## 2025-02-12 PROCEDURE — 81003 URINALYSIS AUTO W/O SCOPE: CPT

## 2025-02-12 PROCEDURE — 99406 BEHAV CHNG SMOKING 3-10 MIN: CPT | Performed by: STUDENT IN AN ORGANIZED HEALTH CARE EDUCATION/TRAINING PROGRAM

## 2025-02-12 PROCEDURE — 85027 COMPLETE CBC AUTOMATED: CPT

## 2025-02-12 PROCEDURE — 96376 TX/PRO/DX INJ SAME DRUG ADON: CPT

## 2025-02-12 PROCEDURE — 80053 COMPREHEN METABOLIC PANEL: CPT

## 2025-02-12 RX ORDER — OXYCODONE HYDROCHLORIDE 5 MG/1
5 TABLET ORAL
Refills: 0 | Status: DISCONTINUED | OUTPATIENT
Start: 2025-02-12 | End: 2025-02-12

## 2025-02-12 RX ORDER — TRAZODONE HYDROCHLORIDE 50 MG/1
50 TABLET ORAL
Status: COMPLETED | OUTPATIENT
Start: 2025-02-12 | End: 2025-02-12

## 2025-02-12 RX ORDER — OXYCODONE HYDROCHLORIDE 5 MG/1
2.5 TABLET ORAL
Refills: 0 | Status: DISCONTINUED | OUTPATIENT
Start: 2025-02-12 | End: 2025-02-12

## 2025-02-12 RX ORDER — BUPROPION HYDROCHLORIDE 150 MG/1
150 TABLET, EXTENDED RELEASE ORAL DAILY
Status: DISCONTINUED | OUTPATIENT
Start: 2025-02-13 | End: 2025-02-15 | Stop reason: HOSPADM

## 2025-02-12 RX ORDER — HYDROMORPHONE HYDROCHLORIDE 1 MG/ML
1 INJECTION, SOLUTION INTRAMUSCULAR; INTRAVENOUS; SUBCUTANEOUS
Status: DISCONTINUED | OUTPATIENT
Start: 2025-02-12 | End: 2025-02-12

## 2025-02-12 RX ORDER — VARENICLINE TARTRATE 0.5 (11)-1
1 KIT ORAL 2 TIMES DAILY
Status: DISCONTINUED | OUTPATIENT
Start: 2025-02-12 | End: 2025-02-12

## 2025-02-12 RX ORDER — OXYCODONE HYDROCHLORIDE 5 MG/1
5 TABLET ORAL
Refills: 0 | Status: DISCONTINUED | OUTPATIENT
Start: 2025-02-12 | End: 2025-02-15 | Stop reason: HOSPADM

## 2025-02-12 RX ORDER — ONDANSETRON 2 MG/ML
4 INJECTION INTRAMUSCULAR; INTRAVENOUS EVERY 4 HOURS PRN
Status: DISCONTINUED | OUTPATIENT
Start: 2025-02-12 | End: 2025-02-15 | Stop reason: HOSPADM

## 2025-02-12 RX ORDER — BACLOFEN 10 MG/1
5-10 TABLET ORAL 3 TIMES DAILY PRN
Status: DISCONTINUED | OUTPATIENT
Start: 2025-02-12 | End: 2025-02-15 | Stop reason: HOSPADM

## 2025-02-12 RX ORDER — SODIUM CHLORIDE 9 MG/ML
500 INJECTION, SOLUTION INTRAVENOUS ONCE
Status: COMPLETED | OUTPATIENT
Start: 2025-02-12 | End: 2025-02-12

## 2025-02-12 RX ORDER — HYDROMORPHONE HYDROCHLORIDE 1 MG/ML
1 INJECTION, SOLUTION INTRAMUSCULAR; INTRAVENOUS; SUBCUTANEOUS
Status: DISCONTINUED | OUTPATIENT
Start: 2025-02-12 | End: 2025-02-15 | Stop reason: HOSPADM

## 2025-02-12 RX ORDER — PROMETHAZINE HYDROCHLORIDE 25 MG/1
12.5-25 TABLET ORAL EVERY 4 HOURS PRN
Status: DISCONTINUED | OUTPATIENT
Start: 2025-02-12 | End: 2025-02-15 | Stop reason: HOSPADM

## 2025-02-12 RX ORDER — GABAPENTIN 300 MG/1
300 CAPSULE ORAL 3 TIMES DAILY
Status: DISCONTINUED | OUTPATIENT
Start: 2025-02-12 | End: 2025-02-15 | Stop reason: HOSPADM

## 2025-02-12 RX ORDER — PROCHLORPERAZINE EDISYLATE 5 MG/ML
5-10 INJECTION INTRAMUSCULAR; INTRAVENOUS EVERY 4 HOURS PRN
Status: DISCONTINUED | OUTPATIENT
Start: 2025-02-12 | End: 2025-02-15 | Stop reason: HOSPADM

## 2025-02-12 RX ORDER — HYDROMORPHONE HYDROCHLORIDE 1 MG/ML
0.25 INJECTION, SOLUTION INTRAMUSCULAR; INTRAVENOUS; SUBCUTANEOUS
Status: DISCONTINUED | OUTPATIENT
Start: 2025-02-12 | End: 2025-02-12

## 2025-02-12 RX ORDER — ENOXAPARIN SODIUM 100 MG/ML
30 INJECTION SUBCUTANEOUS EVERY 12 HOURS
Status: DISCONTINUED | OUTPATIENT
Start: 2025-02-12 | End: 2025-02-15 | Stop reason: HOSPADM

## 2025-02-12 RX ORDER — PROMETHAZINE HYDROCHLORIDE 25 MG/1
12.5-25 SUPPOSITORY RECTAL EVERY 4 HOURS PRN
Status: DISCONTINUED | OUTPATIENT
Start: 2025-02-12 | End: 2025-02-15 | Stop reason: HOSPADM

## 2025-02-12 RX ORDER — HYDROMORPHONE HYDROCHLORIDE 1 MG/ML
0.5 INJECTION, SOLUTION INTRAMUSCULAR; INTRAVENOUS; SUBCUTANEOUS ONCE
Status: COMPLETED | OUTPATIENT
Start: 2025-02-12 | End: 2025-02-12

## 2025-02-12 RX ORDER — OXYCODONE HYDROCHLORIDE 10 MG/1
10 TABLET ORAL
Refills: 0 | Status: DISCONTINUED | OUTPATIENT
Start: 2025-02-12 | End: 2025-02-15 | Stop reason: HOSPADM

## 2025-02-12 RX ORDER — BUPROPION HYDROCHLORIDE 150 MG/1
150 TABLET ORAL EVERY MORNING
Status: DISCONTINUED | OUTPATIENT
Start: 2025-02-12 | End: 2025-02-12

## 2025-02-12 RX ORDER — LABETALOL HYDROCHLORIDE 5 MG/ML
10 INJECTION, SOLUTION INTRAVENOUS EVERY 4 HOURS PRN
Status: DISCONTINUED | OUTPATIENT
Start: 2025-02-12 | End: 2025-02-15 | Stop reason: HOSPADM

## 2025-02-12 RX ORDER — ALBUTEROL SULFATE 90 UG/1
2 INHALANT RESPIRATORY (INHALATION) EVERY 4 HOURS PRN
Status: DISCONTINUED | OUTPATIENT
Start: 2025-02-12 | End: 2025-02-15 | Stop reason: HOSPADM

## 2025-02-12 RX ORDER — SODIUM CHLORIDE, SODIUM LACTATE, POTASSIUM CHLORIDE, CALCIUM CHLORIDE 600; 310; 30; 20 MG/100ML; MG/100ML; MG/100ML; MG/100ML
INJECTION, SOLUTION INTRAVENOUS CONTINUOUS
Status: ACTIVE | OUTPATIENT
Start: 2025-02-12 | End: 2025-02-13

## 2025-02-12 RX ORDER — ONDANSETRON 4 MG/1
4 TABLET, ORALLY DISINTEGRATING ORAL EVERY 4 HOURS PRN
Status: DISCONTINUED | OUTPATIENT
Start: 2025-02-12 | End: 2025-02-15 | Stop reason: HOSPADM

## 2025-02-12 RX ORDER — ACETAMINOPHEN 325 MG/1
650 TABLET ORAL EVERY 6 HOURS PRN
Status: DISCONTINUED | OUTPATIENT
Start: 2025-02-12 | End: 2025-02-15 | Stop reason: HOSPADM

## 2025-02-12 RX ADMIN — OXYCODONE 5 MG: 5 TABLET ORAL at 20:48

## 2025-02-12 RX ADMIN — TRAZODONE HYDROCHLORIDE 50 MG: 50 TABLET ORAL at 23:11

## 2025-02-12 RX ADMIN — OXYCODONE 5 MG: 5 TABLET ORAL at 13:53

## 2025-02-12 RX ADMIN — BACLOFEN 10 MG: 10 TABLET ORAL at 18:26

## 2025-02-12 RX ADMIN — SODIUM CHLORIDE, POTASSIUM CHLORIDE, SODIUM LACTATE AND CALCIUM CHLORIDE: 600; 310; 30; 20 INJECTION, SOLUTION INTRAVENOUS at 15:43

## 2025-02-12 RX ADMIN — GABAPENTIN 300 MG: 100 CAPSULE ORAL at 20:48

## 2025-02-12 RX ADMIN — IOHEXOL 95 ML: 350 INJECTION, SOLUTION INTRAVENOUS at 10:45

## 2025-02-12 RX ADMIN — HYDROMORPHONE HYDROCHLORIDE 0.5 MG: 1 INJECTION, SOLUTION INTRAMUSCULAR; INTRAVENOUS; SUBCUTANEOUS at 11:50

## 2025-02-12 RX ADMIN — HYDROMORPHONE HYDROCHLORIDE 0.5 MG: 1 INJECTION, SOLUTION INTRAMUSCULAR; INTRAVENOUS; SUBCUTANEOUS at 09:04

## 2025-02-12 RX ADMIN — OXYCODONE 5 MG: 5 TABLET ORAL at 18:25

## 2025-02-12 RX ADMIN — SODIUM CHLORIDE 500 ML: 9 INJECTION, SOLUTION INTRAVENOUS at 10:17

## 2025-02-12 SDOH — ECONOMIC STABILITY: TRANSPORTATION INSECURITY
IN THE PAST 12 MONTHS, HAS LACK OF RELIABLE TRANSPORTATION KEPT YOU FROM MEDICAL APPOINTMENTS, MEETINGS, WORK OR FROM GETTING THINGS NEEDED FOR DAILY LIVING?: NO

## 2025-02-12 SDOH — ECONOMIC STABILITY: TRANSPORTATION INSECURITY
IN THE PAST 12 MONTHS, HAS THE LACK OF TRANSPORTATION KEPT YOU FROM MEDICAL APPOINTMENTS OR FROM GETTING MEDICATIONS?: NO

## 2025-02-12 ASSESSMENT — LIFESTYLE VARIABLES
EVER FELT BAD OR GUILTY ABOUT YOUR DRINKING: NO
TOTAL SCORE: 0
CONSUMPTION TOTAL: INCOMPLETE
HAVE YOU EVER FELT YOU SHOULD CUT DOWN ON YOUR DRINKING: NO
CONSUMPTION TOTAL: NEGATIVE
TOTAL SCORE: 0
ALCOHOL_USE: NO
EVER FELT BAD OR GUILTY ABOUT YOUR DRINKING: NO
HAVE YOU EVER FELT YOU SHOULD CUT DOWN ON YOUR DRINKING: NO
TOTAL SCORE: 0
ON A TYPICAL DAY WHEN YOU DRINK ALCOHOL HOW MANY DRINKS DO YOU HAVE: 0
DOES PATIENT WANT TO STOP DRINKING: NO
DO YOU DRINK ALCOHOL: NO
EVER HAD A DRINK FIRST THING IN THE MORNING TO STEADY YOUR NERVES TO GET RID OF A HANGOVER: NO
HOW MANY TIMES IN THE PAST YEAR HAVE YOU HAD 5 OR MORE DRINKS IN A DAY: 0
EVER HAD A DRINK FIRST THING IN THE MORNING TO STEADY YOUR NERVES TO GET RID OF A HANGOVER: NO
AVERAGE NUMBER OF DAYS PER WEEK YOU HAVE A DRINK CONTAINING ALCOHOL: 0
HAVE PEOPLE ANNOYED YOU BY CRITICIZING YOUR DRINKING: NO
HAVE PEOPLE ANNOYED YOU BY CRITICIZING YOUR DRINKING: NO
TOTAL SCORE: 0

## 2025-02-12 ASSESSMENT — SOCIAL DETERMINANTS OF HEALTH (SDOH)
WITHIN THE PAST 12 MONTHS, THE FOOD YOU BOUGHT JUST DIDN'T LAST AND YOU DIDN'T HAVE MONEY TO GET MORE: NEVER TRUE
WITHIN THE LAST YEAR, HAVE TO BEEN RAPED OR FORCED TO HAVE ANY KIND OF SEXUAL ACTIVITY BY YOUR PARTNER OR EX-PARTNER?: NO
WITHIN THE LAST YEAR, HAVE YOU BEEN AFRAID OF YOUR PARTNER OR EX-PARTNER?: NO
WITHIN THE LAST YEAR, HAVE YOU BEEN HUMILIATED OR EMOTIONALLY ABUSED IN OTHER WAYS BY YOUR PARTNER OR EX-PARTNER?: NO
IN THE PAST 12 MONTHS, HAS THE ELECTRIC, GAS, OIL, OR WATER COMPANY THREATENED TO SHUT OFF SERVICE IN YOUR HOME?: NO
WITHIN THE PAST 12 MONTHS, YOU WORRIED THAT YOUR FOOD WOULD RUN OUT BEFORE YOU GOT THE MONEY TO BUY MORE: NEVER TRUE
WITHIN THE LAST YEAR, HAVE YOU BEEN KICKED, HIT, SLAPPED, OR OTHERWISE PHYSICALLY HURT BY YOUR PARTNER OR EX-PARTNER?: NO

## 2025-02-12 ASSESSMENT — COGNITIVE AND FUNCTIONAL STATUS - GENERAL
MOBILITY SCORE: 21
SUGGESTED CMS G CODE MODIFIER DAILY ACTIVITY: CH
SUGGESTED CMS G CODE MODIFIER MOBILITY: CJ
CLIMB 3 TO 5 STEPS WITH RAILING: A LOT
WALKING IN HOSPITAL ROOM: A LITTLE
DAILY ACTIVITIY SCORE: 24

## 2025-02-12 ASSESSMENT — FIBROSIS 4 INDEX: FIB4 SCORE: 8.73

## 2025-02-12 ASSESSMENT — PAIN DESCRIPTION - PAIN TYPE
TYPE: ACUTE PAIN

## 2025-02-12 ASSESSMENT — PATIENT HEALTH QUESTIONNAIRE - PHQ9
SUM OF ALL RESPONSES TO PHQ9 QUESTIONS 1 AND 2: 0
1. LITTLE INTEREST OR PLEASURE IN DOING THINGS: NOT AT ALL
2. FEELING DOWN, DEPRESSED, IRRITABLE, OR HOPELESS: NOT AT ALL

## 2025-02-12 ASSESSMENT — ENCOUNTER SYMPTOMS
NAUSEA: 1
FEVER: 0
CHILLS: 0
SHORTNESS OF BREATH: 0
VOMITING: 0
DIARRHEA: 0
ABDOMINAL PAIN: 1
WEIGHT LOSS: 1
NAUSEA: 0

## 2025-02-12 NOTE — ED TRIAGE NOTES
Pt to triage in WC w/ family.  Chief Complaint   Patient presents with    Abdominal Pain    Nausea     Symptoms x2d. Pt goes on to c/o difficulty sleeping and worsening sciatic pain.

## 2025-02-12 NOTE — CONSULTS
Date of Service  February 12, 2025     Reason for Consult: Concern pancreatic malignancy, biliary obstruction, with multiple liver and likely lung mets    Requested by: ED Physician    Location: ED [facility]    Chief Complaint  Abdominal Pain and Nausea      HPI: Telly Solomon is a 50 y.o. male who presented to emergency department on 2/12/25 for evaluation of abdominal pain.  Symptoms began within the last 24 to 48 hours.  He describes a sensation abdominal discomfort and pain, worse with eating.  States he feels as though he cannot eat or drink because it will contribute to the bloating.  He has diffuse abdominal discomfort.  He does not report associated fevers, vomiting, or diarrhea.  Before the acute symptoms he states he has been diagnosed and treated for what was said to be pneumonia four times in last year. He reports 30 lb unplanned weight loss. He denies known family history of liver or pancreas or gallbladder issues. He noted some females in family had breast cancer. He reports earlier history of excess alcohol use.    He does have a history of chronic low back pain ongoing for the last 3 to 4 months, currently followed by [facility] spine specialist.  He does not report any lower extremity weakness, no saddle anesthesia described, he does have shooting pains down the leg, consistent with his previous diagnosis of sciatica.      CT PANCREAS on 2/12/25 noted incompletely imaged left perihilar mass measuring 6.9 x 4.4 cm in diameter. Extensive hepatic metastases. Bilateral adrenal nodules, concerning for metastases. Perihepatic lymphadenopathy. Hypodense lesion in the head of the pancreas measuring 1 x 1.1 x 1.2 cm in diameter. Wedge-shaped areas of hypodensity in the upper pole and interpolar region of the left kidney. Patchy lucency and sclerosis in the lumbar vertebral bodies, with chronic appearing L1, L2 and L4 endplate deformities.    Labs notable for leukocytosis WBC 11.7,  "elevated LFTs , , and Alk Phos 687, and tbili 9.4, and Lipase elevated at 755.     This afternoon the patient is seated in bed, joined by wife. He is awake and oriented, and reports persistent \"pain throughout all of abdomen\". He appears anxious and depressed.    Past Medical History  Past Medical History:   Diagnosis Date    Heart burn     Pain 04-15-13    back, right leg, 3-4/10    Snoring        Past Surgical History  Past Surgical History:   Procedure Laterality Date    IRRIGATION & DEBRIDEMENT ORTHO  5/7/2013    Performed by Augusta Bowen M.D. at SURGERY [facility]    WOUND CLOSURE ORTHO  5/7/2013    Performed by Augusta Bowen M.D. at SURGERY [facility]    KNEE AMPUTATION BELOW  4/17/2013    Performed by Augusta Bowen M.D. at SURGERY SAME DAY [facility]    KNEE AMPUTATION BELOW  6/29/2012    Performed by AUGUSTA BOWEN at SURGERY [facility]    IRRIGATION & DEBRIDEMENT GENERAL  6/25/2012    Performed by JEANETTE DONALDSON JR at SURGERY [facility]    FLAP FREE  6/25/2012    Performed by JEANETTE DONALDSON JR at SURGERY [facility] ORS    SPLIT THICKNESS SKIN GRAFT  6/25/2012    Performed by JEANETTE DONALDSON JR at SURGERY [facility] ORS    FLAP FREE  6/25/2012    Performed by JEANETTE DONALDSON JR at SURGERY [facility]    ORIF, FRACTURE, TIBIA  6/24/2012    Performed by MERLINE MARTIN at SURGERY [facility]    IRRIGATION & DEBRIDEMENT ORTHO  6/24/2012    Performed by MERLINE MARTIN at SURGERY [facility]    IRRIGATION & DEBRIDEMENT ORTHO  6/19/2012    Performed by MERLINE MARTIN at SURGERY [facility] ORS    INCISION AND DRAINAGE ORTHOPEDIC  6/17/2012    Performed by MERLINE MARTNI at SURGERY [facility] ORS    EXTERNAL FIXATOR APPLICATION  6/17/2012    Performed by MERLINE MARTIN at SURGERY [facility]    EXTERNAL FIXATOR APPLICATION  6/15/2012    Performed by JAYLA LARIOS at SURGERY [facility]    INCISION AND " "DRAINAGE ORTHOPEDIC  6/15/2012    Performed by JAYLA LARIOS at SURGERY [facility] ORS    CHEST TUBE INSERTION  6/15/2012    Performed by JAYLA LARIOS at SURGERY [facility] ORS     Allergies:   Allergies   Allergen Reactions    Morphine Unspecified     \"makes me weird\" confussion    Other Reaction(s): wild dreams       Problem List  Active Problems:    * No active hospital problems. *  Resolved Problems:    * No resolved hospital problems. *       Subjective  Review of Systems   Constitutional:  Positive for weight loss. Negative for chills, fever and malaise/fatigue.   Cardiovascular:  Negative for chest pain and leg swelling.   Gastrointestinal:  Positive for abdominal pain. Negative for nausea and vomiting.         Objective  Temp:  [36.3 °C (97.4 °F)] 36.3 °C (97.4 °F)  Pulse:  [] 71  Resp:  [18-24] 24  BP: (125-154)/(72-84) 149/76  SpO2:  [90 %-97 %] 90 %      Physical Exam  Vitals and nursing note reviewed.   Constitutional:       General: He is not in acute distress.     Appearance: He is ill-appearing.   HENT:      Head: Normocephalic and atraumatic.      Nose: Nose normal.      Mouth/Throat:      Pharynx: Oropharynx is clear.   Cardiovascular:      Rate and Rhythm: Normal rate.   Pulmonary:      Effort: Pulmonary effort is normal. No respiratory distress.   Abdominal:      General: There is no distension.      Tenderness: There is abdominal tenderness. There is no guarding.   Musculoskeletal:      Comments: Right BKA   Skin:     General: Skin is warm and dry.      Coloration: Skin is jaundiced.   Neurological:      Mental Status: He is alert and oriented to person, place, and time.   Psychiatric:         Mood and Affect: Mood is anxious and depressed.         Behavior: Behavior normal.        Fluids    Intake/Output Summary (Last 24 hours) at 2/12/2025 1304  Last data filed at 2/12/2025 1047  Gross per 24 hour   Intake 500 ml   Output --   Net 500 ml         Labs  Lab Results   Component Value " Date/Time    SODIUM 136 02/12/2025 08:18 AM    POTASSIUM 4.6 02/12/2025 08:18 AM    CHLORIDE 102 02/12/2025 08:18 AM    CO2 21 02/12/2025 08:18 AM    GLUCOSE 99 02/12/2025 08:18 AM    BUN 19 02/12/2025 08:18 AM    CREATININE 0.74 02/12/2025 08:18 AM    GLOMRATE 93 03/24/2022 07:37 AM         Lab Results   Component Value Date/Time    PROTHROMBTM 14.2 06/15/2012 05:24 PM    INR 1.09 06/15/2012 05:24 PM         Lab Results   Component Value Date/Time    WBC 11.7 (H) 02/12/2025 08:18 AM    RBC 4.91 02/12/2025 08:18 AM    HEMOGLOBIN 14.5 02/12/2025 08:18 AM    HEMATOCRIT 40.6 (L) 02/12/2025 08:18 AM    MCV 82.7 02/12/2025 08:18 AM    MCH 29.5 02/12/2025 08:18 AM    MCHC 35.7 02/12/2025 08:18 AM    MPV 10.5 02/12/2025 08:18 AM    NEUTSPOLYS 43.60 (L) 02/12/2025 08:18 AM    LYMPHOCYTES 40.20 02/12/2025 08:18 AM    MONOCYTES 10.30 02/12/2025 08:18 AM    EOSINOPHILS 0.80 02/12/2025 08:18 AM    BASOPHILS 0.00 02/12/2025 08:18 AM         Recent Labs     02/12/25  0818   ASTSGOT 277*   ALTSGPT 278*   TBILIRUBIN 9.4*   GLOBULIN 3.4      Imaging  CT PANCREAS (2/12/25)  IMPRESSION:  1. Incompletely imaged left perihilar mass measuring 6.9 x 4.4 cm in diameter.  2. Extensive hepatic metastases.  3. Bilateral adrenal nodules, concerning for metastases.  4. Perihepatic lymphadenopathy.  5. Hypodense lesion in the head of the pancreas measuring 1 x 1.1 x 1.2 cm in diameter. Further evaluation with MRI of the abdomen with and without IV contrast with a pancreas protocol is recommended.  6. Wedge-shaped areas of hypodensity in the upper pole and interpolar region of the left kidney, not otherwise characterizable on this exam.  7. Patchy lucency and sclerosis in the lumbar vertebral bodies, with chronic appearing L1, L2 and L4 endplate deformities.    Pathology  N/A     Assessment and Plan  Patient is a 50M with acute onset abdominal pain, jaundice, and recent 30 lb weight loss. CT PANCREAS on 2/12/25 noted left perihilar mass measuring  6.9 cm. Pancreatic head mass 1.2 cm. Concern possible mets in liver, adrenals, and left kidney. Concern multiple lumbar bony lesions and/or fractures. Tbili 9.4, Lipase 755.     Pancreatic head mass  - CT PANCREAS protocol  - CA 19-9 when tbili more normalized  - BX of pancreatic lesion as possible    2. Biliary obstruction, elevated tbili 9.4  - MRCP  - Suggest ERCP, consider stent, bx   - NPO from midnight  - Daily CMP    3. Possible acute pancreatitis, Lipase 775 elevated  - Supportive measures  - CT PANCREAS protocol    4. Lung mass  - CT CHEST    Patient will need CT PET likely outpatient. Consider outpatient BX liver and/or bx lung mass    The assessment and plan discussed with Dr Grigsby

## 2025-02-12 NOTE — ASSESSMENT & PLAN NOTE
CT abdomen pelvis noted with left perihilar mass, extensive hepatic metastasis, hypodense lesion in the head of pancreas.      MRCP shows pancreatic head mass, liver mets, adrenal nodules; no CBD or pancreatic duct dilation; defer GI consult for now  S/p liver biopsy today, path pending  Follow up surgical oncology outpatient  Appreciate surgical oncology recommendations

## 2025-02-12 NOTE — ED PROVIDER NOTES
Emergency Physician Note    Chief Concern:  Chief Complaint   Patient presents with    Abdominal Pain    Nausea         External Records Reviewed:  Outpatient clinic notes reviewed: Patient was seen at [facility] 2/6/2025, physician assistant note reviewed from that visit.  He has a history of right BKA in 2014.  About 1 to 2 months ago he noticed low back pain radiating down both of his legs.  He has recently been started on gabapentin, currently followed by pain management.  Plan is for MR imaging on an outpatient basis.    HPI/ROS     Outside Historians:   Family member at bedside provides additional history.     HPI:  Telly Solomon is a 50 y.o. male who presents to the emergency department today for evaluation of abdominal pain.  Symptoms began within the last 24 to 48 hours.  He describes a sensation of nausea, and bloating.  States he feels as though he cannot eat or drink because it will contribute to the bloating.  He has diffuse abdominal discomfort with no localized abdominal pain.  He does not report associated fevers, vomiting, or diarrhea.  On review of systems, he states he does have some chest discomfort localized to the left chest, this does not radiate to the jaw nor arms, does not radiate to the thoracic back.  He does have a history of chronic low back pain ongoing for the last 3 to 4 months, currently followed by [facility] spine specialist.  He does not report any lower extremity weakness, no saddle anesthesia described, he does have shooting pains down the leg, consistent with his previous diagnosis of sciatica.  He states he has episodes of where he feels very hot, but has not noticed any episodes of true fever, has not felt febrile.  He is afebrile on arrival to the emergency department.      PAST MEDICAL HISTORY  Past Medical History:   Diagnosis Date    Heart burn     Pain 04-15-13    back, right leg, 3-4/10    Snoring        SURGICAL HISTORY  Past Surgical  History:   Procedure Laterality Date    IRRIGATION & DEBRIDEMENT ORTHO  5/7/2013    Performed by Augusta Bowen M.D. at SURGERY [facility]    WOUND CLOSURE ORTHO  5/7/2013    Performed by Augusta Bowen M.D. at SURGERY [facility]    KNEE AMPUTATION BELOW  4/17/2013    Performed by Augusta Bowen M.D. at SURGERY SAME DAY [facility]    KNEE AMPUTATION BELOW  6/29/2012    Performed by AUGUSTA BOWEN at SURGERY [facility]    IRRIGATION & DEBRIDEMENT GENERAL  6/25/2012    Performed by JEANETTE DONALDSON JR at SURGERY [facility]    FLAP FREE  6/25/2012    Performed by JEANETTE DONALDSON JR at SURGERY [facility]    SPLIT THICKNESS SKIN GRAFT  6/25/2012    Performed by JEANETTE DONALDSON JR at SURGERY [facility]    FLAP FREE  6/25/2012    Performed by JEANETTE DONALDSON JR at SURGERY [facility]    ORIF, FRACTURE, TIBIA  6/24/2012    Performed by MERLINE MARTIN at SURGERY [facility]    IRRIGATION & DEBRIDEMENT ORTHO  6/24/2012    Performed by MERLINE MARTIN at SURGERY [facility]    IRRIGATION & DEBRIDEMENT ORTHO  6/19/2012    Performed by MERLINE MARTIN at SURGERY [facility]    INCISION AND DRAINAGE ORTHOPEDIC  6/17/2012    Performed by MERLINE MARTIN at SURGERY [facility]    EXTERNAL FIXATOR APPLICATION  6/17/2012    Performed by MERLINE MARTIN at SURGERY [facility]    EXTERNAL FIXATOR APPLICATION  6/15/2012    Performed by JAYLA LARIOS at SURGERY [facility]    INCISION AND DRAINAGE ORTHOPEDIC  6/15/2012    Performed by JAYLA LARIOS at SURGERY [facility]    CHEST TUBE INSERTION  6/15/2012    Performed by JAYLA LARIOS at SURGERY [facility]       FAMILY HISTORY  No family history on file.    SOCIAL HISTORY   reports that he has been smoking cigarettes. He has never used smokeless tobacco. He reports that he does not drink alcohol and does not use drugs.    CURRENT MEDICATIONS  Previous Medications    ALBUTEROL 108 (90  "BASE) MCG/ACT AERO SOLN INHALATION AEROSOL    Inhale 2 Puffs every four hours as needed for Shortness of Breath.    AZITHROMYCIN (ZITHROMAX) 250 MG TAB    Take 1 Tablet by mouth every day.    BACLOFEN (LIORESAL) 5 MG TAB    Take 1-2 Tablets by mouth 3 times a day as needed (spasm).    BENZONATATE (TESSALON) 100 MG CAP    Take 1 Capsule by mouth 3 times a day as needed for Cough.    BUPROPION (WELLBUTRIN XL) 150 MG XL TABLET    TAKE ONE TABLET BY MOUTH EVERY MORNING    CEPHALEXIN (KEFLEX) 500 MG CAP    Take 1 Capsule by mouth 4 times a day.    CLOTRIMAZOLE-BETAMETHASONE (LOTRISONE) 1-0.05 % CREAM    GENERIC LOTRISONE APPLY ONE GRAM TO AFFECTED AREA(S) TWO TIMES A DAY    CYCLOBENZAPRINE (FLEXERIL) 5 MG TABLET    Take 1 Tablet by mouth 3 times a day as needed for Moderate Pain.    GABAPENTIN (NEURONTIN) 100 MG CAP    Take 1 Capsule by mouth 3 times a day.    GABAPENTIN (NEURONTIN) 100 MG CAP    Take 3 Capsules by mouth 3 times a day for 90 days.    HYDROCODONE-ACETAMINOPHEN (NORCO) 5-325 MG TAB PER TABLET    Take 1-2 Tabs by mouth every four hours as needed (1 PO Daily PRN).    HYDROCODONE-ACETAMINOPHEN (NORCO) 5-325 MG TAB PER TABLET    90    MELOXICAM (MOBIC) 15 MG TABLET    1 tablet PO daily with food. No advil/aleve/motrin/ibuprofen on same day.    METHYLPREDNISOLONE (MEDROL DOSEPAK) 4 MG TABLET THERAPY PACK    As directed on the packaging label.    METHYLPREDNISOLONE (MEDROL DOSEPAK) 4 MG TABLET THERAPY PACK    Follow schedule on package instructions.    TIZANIDINE (ZANAFLEX) 4 MG TAB    Take 1 Tablet by mouth every 6 hours as needed (pain).    VARENICLINE TARTRATE, STARTER, (CHANTIX STARTING MONTH PAK) 0.5 MG X 11 & 1 MG X 42 TABLET THERAPY PACK    Take 1 Each by mouth 2 times a day.       ALLERGIES  Morphine    PHYSICAL EXAM  Vital Signs: /84   Pulse 96   Temp 36.3 °C (97.4 °F) (Temporal)   Resp 18   Ht 1.778 m (5' 10\")   Wt 81.6 kg (180 lb)   SpO2 97%   BMI 25.83 kg/m²   Constitutional: Alert, no " acute distress, afebrile.  HENT: Normocephalic, atraumatic.  Diaphoretic.  Cardiovascular: No tachycardia, regular rate and rhythm, no murmur appreciated  Pulmonary: No respiratory distress, normal work of breathing, breath sounds quite equal bilaterally, no wheezing, no coarse breath sounds  Abdomen: Soft, no severe distention, diffuse discomfort on abdominal palpation, no peritoneal signs, no rebound, no guarding.  Skin: Warm, dry, no rashes or lesions  Musculoskeletal: Normal range of motion in all extremities, no swelling or deformity noted, right BKA  Neurologic: Alert, oriented, normal motor function, no speech deficits    Diagnostic Studies & Procedures    Labs:  All labs reviewed by me as noted below.    EK Lead EKG interpreted by me as noted below  Results for orders placed or performed during the hospital encounter of 25   EKG   Result Value Ref Range    Report       [facility] Emergency Dept.    Test Date:  2025  Pt Name:    SONYA MORAN                 Department: ER  MRN:        [id number]                      Room:       [facility]  Gender:     Male                         Technician: [id number]  :        1975                   Requested By:VIKAS VERGARA  Order #:    [id number]                    Reading MD: VIKAS VERGARA MD    Measurements  Intervals                                Axis  Rate:       83                           P:          68  IN:         137                          QRS:        161  QRSD:       95                           T:          3  QT:         390  QTc:        459    Interpretive Statements  Rate 83, sinus rhythm, no ST elevation or depression, no pathologic T wave  inversions, T wave flattening in 3 and aVF, no prior available for  comparison.  Electronically Signed On 2025 07:37:41 PST by VIKAS VERGARA MD       Radiology:  The attending Emergency Physician has independently interpreted the following imaging:  I independently reviewed  the images of the CT abdomen and pelvis, hepatic mass identified.    CT-LSPINE W/O PLUS RECONS   Final Result      1. Mixed lucency and sclerosis involving the lumbar vertebral bodies, as well as the first sacral segment.   2. End plate deformities of the L1, L2 and L4 vertebral bodies are age-indeterminate. MRI of the lumbar spine without and with IV contrast is recommended.   3. Fracture of the left transverse process of the L3 vertebral body.      CT-ABDOMEN-PELVIS WITH   Final Result      1. Incompletely imaged left perihilar mass measuring 6.9 x 4.4 cm in diameter.   2. Extensive hepatic metastases.   3. Bilateral adrenal nodules, concerning for metastases.   4. Perihepatic lymphadenopathy.   5. Hypodense lesion in the head of the pancreas measuring 1 x 1.1 x 1.2 cm in diameter. Further evaluation with MRI of the abdomen with and without IV contrast with a pancreas protocol is recommended.   6. Wedge-shaped areas of hypodensity in the upper pole and interpolar region of the left kidney, not otherwise characterizable on this exam.   7. Patchy lucency and sclerosis in the lumbar vertebral bodies, with chronic appearing L1, L2 and L4 endplate deformities.     Course and Medical Decision Making    Initial Assessment and Plan:  Mr. Solomon presents to the emergency department today for evaluation of abdominal pain and bloating as documented above.  On arrival, his vital signs are reassuring with no fever, no tachycardia, no hypotension.  Abdominal is benign without peritoneal signs, however he appears to have scleral icterus on my initial evaluation, raising concern for biliary process.  Differential diagnosis is broad including biliary obstruction, pancreatitis, intra-abdominal mass or malignancy, or mild intra-abdominal infection.  Additionally, he is diaphoretic on arrival, which is also concerning.    On laboratory evaluation urinalysis is negative for evidence of infection, proBNP is just above normal reference  range.  Lipase is elevated to 755, which raises question of pancreatitis.  Liver enzymes are markedly elevated with , , alkaline phosphatase 687, and total bilirubin of 9.4.  White blood count is just above normal reference range at 11.7.    CT of the abdomen pelvis demonstrates a left perihilar mass, hepatic metastases, adrenal nodules concerning for metastases, and a lesion in the head of the pancreas.  Additionally, he has mixed lucency and sclerosis of the lumbar vertebral bodies, contrasted MR imaging recommended.    He was treated with Zofran and hydromorphone improvement in his symptoms, though did not have resolution of pain.  I discussed his presentation with Dr. Grigsby, hepatobiliary surgeon, who will evaluate the patient, recommends admission to hospitalist service.  Patient will be admitted for further evaluation and treatment of likely pancreatic cancer.    Additional Problems and Disposition    I have discussed management of the patient with the following physicians:   1.  Dr. Grigsby, surgical oncologist  2. Dr. Wolff, hospitalist    Disposition:  Admit in guarded condition    FINAL IMPRESSION   1. Transaminitis    2. Hilar mass    3. Pancreatic mass    4. Jaundice

## 2025-02-12 NOTE — PROGRESS NOTES
"/79   Pulse 96   Temp 36.6 °C (97.8 °F) (Temporal)   Resp 18   Ht 1.778 m (5' 10\")   Wt 81.6 kg (180 lb)   SpO2 92%   BMI 25.83 kg/m²       Patient reports pain at 4 on a scale of 0-10. Educated patient regarding pharmacologic and non pharmacologic modalities for pain management. Oriented to room call light and smoking policy.  Reviewed plan of care (equipment, incentive spirometer, sequential compression devices, medications, activity, diet, fall precautions, skin care, and pain) with patient and family. Welcome packet given and reviewed with patient, all questions answered. Education provided on oral hygiene program.    AA&Ox4. Denies CP/SOB.  See 2 RN skin note  Tolerating NPO (sips/chips) diet. Denies N/V.  + void. Last BM  PTA  Pt ambulates by self with wheelchair  All needs met at this time. Call light within reach. Pt calls appropriately. Bed low and locked, non skid socks in place. Hourly rounding in place.    "

## 2025-02-12 NOTE — PROGRESS NOTES
4 Eyes Skin Assessment Completed by DENISE Purdy and DENISE Resendiz.    Head WDL  Ears WDL  Nose WDL  Mouth WDL  Neck WDL  Breast/Chest WDL  Shoulder Blades WDL  Spine WDL  (R) Arm/Elbow/Hand WDL  (L) Arm/Elbow/Hand WDL, Missing fingers  Abdomen WDL  Groin WDL  Scrotum/Coccyx/Buttocks Rash  (R) Leg WDL, BKA  (L) Leg WDL  (R) Heel/Foot/Toe N/A  (L) Heel/Foot/Toe Rash          Devices In Places Pulse Ox      Interventions In Place Pillows and Low Air Loss Mattress    Possible Skin Injury No    Pictures Uploaded Into Epic N/A  Wound Consult Placed N/A  RN Wound Prevention Protocol Ordered Yes

## 2025-02-12 NOTE — PROGRESS NOTES
GI team reviewed the CAT scan and the lab of this patient.  CAT scan shows possible pancreatic cancer with multiple, high tumor burden metastatic disease in the liver.  MRI of liver and pancreas are still pending.  There is no evidence of intra or extra hepatic biliary dilatation.  At this time, no intervention from the GI team for total bilirubin.  For diagnosis, if MRI is consistent with the CAT scan finding GI team will suggest liver nodules biopsy by IR to further confirm the diagnosis.      Please call us back if other GI procedure could be helpful for this patient.

## 2025-02-12 NOTE — ASSESSMENT & PLAN NOTE
CT L-spine noted with endplate deformities of L1-L2 L4.  Patient has ongoing chronic back pain and orthopedics  The suspected mid to L-spine   MRI L spine shows osseous metastasis

## 2025-02-12 NOTE — ED NOTES
Called for pt to go back to front lobby- not found in lobby or blood draw hallway or family room. Will call for pt again later.
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
## 2025-02-12 NOTE — ED NOTES
Med Rec complete per patient and pharmacy   Allergies reviewed  Antibiotics in the past 30 days:no  Anticoagulant in past 14 days:no  Pharmacy patient utilizes:[facility] in [place]    Medrol dispensed on 02/6/25. Pt states he completed course and that that was his 5th round of Medrol dosepak.

## 2025-02-12 NOTE — ED NOTES
Iv started,medicated for pain  EKG completed  ,placed cardiac monitor  Updated pt poc, waiting for ct

## 2025-02-12 NOTE — ASSESSMENT & PLAN NOTE
Improved  Stop IV fluids  Zofran 4 mg IVP Q 6 H PRN for nauseas or vomiting   Pain management  serial abdominal exam

## 2025-02-12 NOTE — ASSESSMENT & PLAN NOTE
I spent 5 minutes on tobacco cessation education and counseling  Patient reports he is making provide an on Chantix  I discussed benefit of quitting smoking risks of continued smoking  Patient reports he will quit smoking  Have added nicotine patch

## 2025-02-13 ENCOUNTER — APPOINTMENT (OUTPATIENT)
Dept: RADIOLOGY | Facility: MEDICAL CENTER | Age: 50
DRG: 420 | End: 2025-02-13
Attending: STUDENT IN AN ORGANIZED HEALTH CARE EDUCATION/TRAINING PROGRAM
Payer: COMMERCIAL

## 2025-02-13 ENCOUNTER — ANESTHESIA EVENT (OUTPATIENT)
Dept: SURGERY | Facility: MEDICAL CENTER | Age: 50
DRG: 420 | End: 2025-02-13
Payer: COMMERCIAL

## 2025-02-13 ENCOUNTER — APPOINTMENT (OUTPATIENT)
Dept: RADIOLOGY | Facility: MEDICAL CENTER | Age: 50
DRG: 420 | End: 2025-02-13
Payer: COMMERCIAL

## 2025-02-13 LAB
ALBUMIN SERPL BCP-MCNC: 2.7 G/DL (ref 3.2–4.9)
ALBUMIN/GLOB SERPL: 0.9 G/DL
ALP SERPL-CCNC: 575 U/L (ref 30–99)
ALT SERPL-CCNC: 236 U/L (ref 2–50)
ANION GAP SERPL CALC-SCNC: 12 MMOL/L (ref 7–16)
AST SERPL-CCNC: 234 U/L (ref 12–45)
BILIRUB SERPL-MCNC: 8.6 MG/DL (ref 0.1–1.5)
BUN SERPL-MCNC: 18 MG/DL (ref 8–22)
CALCIUM ALBUM COR SERPL-MCNC: 10.4 MG/DL (ref 8.5–10.5)
CALCIUM SERPL-MCNC: 9.4 MG/DL (ref 8.5–10.5)
CHLORIDE SERPL-SCNC: 102 MMOL/L (ref 96–112)
CO2 SERPL-SCNC: 20 MMOL/L (ref 20–33)
CREAT SERPL-MCNC: 0.53 MG/DL (ref 0.5–1.4)
EKG IMPRESSION: NORMAL
ERYTHROCYTE [DISTWIDTH] IN BLOOD BY AUTOMATED COUNT: 50.4 FL (ref 35.9–50)
GFR SERPLBLD CREATININE-BSD FMLA CKD-EPI: 121 ML/MIN/1.73 M 2
GLOBULIN SER CALC-MCNC: 3 G/DL (ref 1.9–3.5)
GLUCOSE SERPL-MCNC: 85 MG/DL (ref 65–99)
HCT VFR BLD AUTO: 35.5 % (ref 42–52)
HGB BLD-MCNC: 12.5 G/DL (ref 14–18)
MCH RBC QN AUTO: 29.1 PG (ref 27–33)
MCHC RBC AUTO-ENTMCNC: 35.2 G/DL (ref 32.3–36.5)
MCV RBC AUTO: 82.8 FL (ref 81.4–97.8)
PLATELET # BLD AUTO: 76 K/UL (ref 164–446)
PLATELETS.RETICULATED NFR BLD AUTO: 4.2 % (ref 0.6–13.1)
PMV BLD AUTO: 9.9 FL (ref 9–12.9)
POTASSIUM SERPL-SCNC: 4.4 MMOL/L (ref 3.6–5.5)
PROT SERPL-MCNC: 5.7 G/DL (ref 6–8.2)
RBC # BLD AUTO: 4.29 M/UL (ref 4.7–6.1)
SODIUM SERPL-SCNC: 134 MMOL/L (ref 135–145)
WBC # BLD AUTO: 8.3 K/UL (ref 4.8–10.8)

## 2025-02-13 PROCEDURE — 99233 SBSQ HOSP IP/OBS HIGH 50: CPT | Performed by: STUDENT IN AN ORGANIZED HEALTH CARE EDUCATION/TRAINING PROGRAM

## 2025-02-13 PROCEDURE — 700117 HCHG RX CONTRAST REV CODE 255: Mod: JZ | Performed by: STUDENT IN AN ORGANIZED HEALTH CARE EDUCATION/TRAINING PROGRAM

## 2025-02-13 PROCEDURE — 73030 X-RAY EXAM OF SHOULDER: CPT | Mod: LT

## 2025-02-13 PROCEDURE — 85055 RETICULATED PLATELET ASSAY: CPT

## 2025-02-13 PROCEDURE — 770001 HCHG ROOM/CARE - MED/SURG/GYN PRIV*

## 2025-02-13 PROCEDURE — 74181 MRI ABDOMEN W/O CONTRAST: CPT

## 2025-02-13 PROCEDURE — 700111 HCHG RX REV CODE 636 W/ 250 OVERRIDE (IP): Mod: JZ | Performed by: STUDENT IN AN ORGANIZED HEALTH CARE EDUCATION/TRAINING PROGRAM

## 2025-02-13 PROCEDURE — 36415 COLL VENOUS BLD VENIPUNCTURE: CPT

## 2025-02-13 PROCEDURE — 85027 COMPLETE CBC AUTOMATED: CPT

## 2025-02-13 PROCEDURE — A9270 NON-COVERED ITEM OR SERVICE: HCPCS | Performed by: STUDENT IN AN ORGANIZED HEALTH CARE EDUCATION/TRAINING PROGRAM

## 2025-02-13 PROCEDURE — 72158 MRI LUMBAR SPINE W/O & W/DYE: CPT

## 2025-02-13 PROCEDURE — 73000 X-RAY EXAM OF COLLAR BONE: CPT | Mod: LT

## 2025-02-13 PROCEDURE — 700102 HCHG RX REV CODE 250 W/ 637 OVERRIDE(OP): Performed by: STUDENT IN AN ORGANIZED HEALTH CARE EDUCATION/TRAINING PROGRAM

## 2025-02-13 PROCEDURE — 99232 SBSQ HOSP IP/OBS MODERATE 35: CPT | Performed by: SURGERY

## 2025-02-13 PROCEDURE — 80053 COMPREHEN METABOLIC PANEL: CPT

## 2025-02-13 PROCEDURE — A9579 GAD-BASE MR CONTRAST NOS,1ML: HCPCS | Mod: JZ | Performed by: STUDENT IN AN ORGANIZED HEALTH CARE EDUCATION/TRAINING PROGRAM

## 2025-02-13 RX ADMIN — OXYCODONE HYDROCHLORIDE 10 MG: 10 TABLET ORAL at 07:33

## 2025-02-13 RX ADMIN — OXYCODONE 5 MG: 5 TABLET ORAL at 14:57

## 2025-02-13 RX ADMIN — OXYCODONE HYDROCHLORIDE 10 MG: 10 TABLET ORAL at 21:54

## 2025-02-13 RX ADMIN — ONDANSETRON 4 MG: 2 INJECTION INTRAMUSCULAR; INTRAVENOUS at 03:05

## 2025-02-13 RX ADMIN — OXYCODONE HYDROCHLORIDE 10 MG: 10 TABLET ORAL at 11:51

## 2025-02-13 RX ADMIN — BACLOFEN 10 MG: 10 TABLET ORAL at 11:52

## 2025-02-13 RX ADMIN — GABAPENTIN 300 MG: 100 CAPSULE ORAL at 21:12

## 2025-02-13 RX ADMIN — GADOTERIDOL 15 ML: 279.3 INJECTION, SOLUTION INTRAVENOUS at 14:30

## 2025-02-13 RX ADMIN — BACLOFEN 10 MG: 10 TABLET ORAL at 07:33

## 2025-02-13 RX ADMIN — OXYCODONE HYDROCHLORIDE 10 MG: 10 TABLET ORAL at 02:40

## 2025-02-13 RX ADMIN — BACLOFEN 10 MG: 10 TABLET ORAL at 21:12

## 2025-02-13 RX ADMIN — BUPROPION HYDROCHLORIDE 150 MG: 150 TABLET, FILM COATED, EXTENDED RELEASE ORAL at 05:19

## 2025-02-13 RX ADMIN — ALBUTEROL SULFATE 2 PUFF: 90 AEROSOL, METERED RESPIRATORY (INHALATION) at 08:07

## 2025-02-13 RX ADMIN — GABAPENTIN 300 MG: 100 CAPSULE ORAL at 05:20

## 2025-02-13 RX ADMIN — GABAPENTIN 300 MG: 100 CAPSULE ORAL at 12:37

## 2025-02-13 RX ADMIN — HYDROMORPHONE HYDROCHLORIDE 1 MG: 1 INJECTION, SOLUTION INTRAMUSCULAR; INTRAVENOUS; SUBCUTANEOUS at 12:37

## 2025-02-13 RX ADMIN — HYDROMORPHONE HYDROCHLORIDE 1 MG: 1 INJECTION, SOLUTION INTRAMUSCULAR; INTRAVENOUS; SUBCUTANEOUS at 23:42

## 2025-02-13 RX ADMIN — HYDROMORPHONE HYDROCHLORIDE 1 MG: 1 INJECTION, SOLUTION INTRAMUSCULAR; INTRAVENOUS; SUBCUTANEOUS at 05:20

## 2025-02-13 ASSESSMENT — ENCOUNTER SYMPTOMS
EYE PAIN: 0
NAUSEA: 0
INSOMNIA: 0
HEADACHES: 0
FOCAL WEAKNESS: 0
BLURRED VISION: 0
VOMITING: 0
FEVER: 0
ABDOMINAL PAIN: 1
DIZZINESS: 0
SENSORY CHANGE: 0
SHORTNESS OF BREATH: 0
CHILLS: 0
BACK PAIN: 0
PALPITATIONS: 0
COUGH: 0

## 2025-02-13 ASSESSMENT — PAIN DESCRIPTION - PAIN TYPE
TYPE: ACUTE PAIN

## 2025-02-13 ASSESSMENT — LIFESTYLE VARIABLES: SUBSTANCE_ABUSE: 0

## 2025-02-13 NOTE — PROGRESS NOTES
Surgical Progress Note    Author: Panchito Grigsby M.D. Date & Time created: 2025   11:34 AM     Interval Events:  No acute events overnight.  Patient with some mild abdominal pain.  MRI still pending.  Bilirubin is 8  ROS  Hemodynamics:  Temp (24hrs), Av.6 °C (97.8 °F), Min:36.5 °C (97.7 °F), Max:36.7 °C (98.1 °F)  Temperature: 36.5 °C (97.7 °F)  Pulse  Av  Min: 71  Max: 108   Blood Pressure: 111/71     Respiratory:    Respiration: 18, Pulse Oximetry: 91 %        RUL Breath Sounds: Clear, RML Breath Sounds: Clear, RLL Breath Sounds: Clear, CALLIE Breath Sounds: Clear, LLL Breath Sounds: Clear  Neuro:  GCS       Fluids:  No intake or output data in the 24 hours ending 25 1134  Weight: 81.6 kg (179 lb 14.3 oz)  Current Diet Order   Procedures    Diet NPO Restrict to: Sips with Medications     Physical Exam  Constitutional:       Appearance: He is ill-appearing.   Cardiovascular:      Rate and Rhythm: Normal rate.      Pulses: Normal pulses.      Heart sounds: Normal heart sounds.   Pulmonary:      Effort: Pulmonary effort is normal. No respiratory distress.   Abdominal:      General: There is distension.      Tenderness: There is abdominal tenderness.   Musculoskeletal:      Comments: Right BKA noted   Neurological:      General: No focal deficit present.      Mental Status: He is alert and oriented to person, place, and time.   Psychiatric:         Mood and Affect: Mood normal.       Labs:  Recent Results (from the past 24 hours)   CBC without Differential    Collection Time: 25  4:55 AM   Result Value Ref Range    WBC 8.3 4.8 - 10.8 K/uL    RBC 4.29 (L) 4.70 - 6.10 M/uL    Hemoglobin 12.5 (L) 14.0 - 18.0 g/dL    Hematocrit 35.5 (L) 42.0 - 52.0 %    MCV 82.8 81.4 - 97.8 fL    MCH 29.1 27.0 - 33.0 pg    MCHC 35.2 32.3 - 36.5 g/dL    RDW 50.4 (H) 35.9 - 50.0 fL    Platelet Count 76 (L) 164 - 446 K/uL    MPV 9.9 9.0 - 12.9 fL   Comp Metabolic Panel (CMP)    Collection Time: 25  4:55 AM    Result Value Ref Range    Sodium 134 (L) 135 - 145 mmol/L    Potassium 4.4 3.6 - 5.5 mmol/L    Chloride 102 96 - 112 mmol/L    Co2 20 20 - 33 mmol/L    Anion Gap 12.0 7.0 - 16.0    Glucose 85 65 - 99 mg/dL    Bun 18 8 - 22 mg/dL    Creatinine 0.53 0.50 - 1.40 mg/dL    Calcium 9.4 8.5 - 10.5 mg/dL    Correct Calcium 10.4 8.5 - 10.5 mg/dL    AST(SGOT) 234 (H) 12 - 45 U/L    ALT(SGPT) 236 (H) 2 - 50 U/L    Alkaline Phosphatase 575 (H) 30 - 99 U/L    Total Bilirubin 8.6 (H) 0.1 - 1.5 mg/dL    Albumin 2.7 (L) 3.2 - 4.9 g/dL    Total Protein 5.7 (L) 6.0 - 8.2 g/dL    Globulin 3.0 1.9 - 3.5 g/dL    A-G Ratio 0.9 g/dL   IMMATURE PLT FRACTION    Collection Time: 25  4:55 AM   Result Value Ref Range    Imm. Plt Fraction 4.2 0.6 - 13.1 %   ESTIMATED GFR    Collection Time: 25  4:55 AM   Result Value Ref Range    GFR (CKD-EPI) 121 >60 mL/min/1.73 m 2   EKG    Collection Time: 25  7:37 AM   Result Value Ref Range    Report       [facility] Emergency Dept.    Test Date:  2025  Pt Name:    SONYA MORAN                 Department: ER  MRN:        [id number]                      Room:       [facility]  Gender:     Male                         Technician: [id number]  :        1975                   Requested By:VIKAS VERGARA  Order #:    [id number]                    Reading MD: VIKAS VERGARA MD    Measurements  Intervals                                Axis  Rate:       83                           P:          68  DC:         137                          QRS:        161  QRSD:       95                           T:          3  QT:         390  QTc:        459    Interpretive Statements  Rate 83, sinus rhythm, no ST elevation or depression, no pathologic T wave  inversions, T wave flattening in 3 and aVF, no prior available for  comparison.  Electronically Signed On 2025 07:37:41 PST by VIKAS VERGARA MD       Medical Decision Making, by Problem:  Active Hospital Problems    Diagnosis      Acute pancreatitis [K85.90]     Hypercalcemia [E83.52]     Pancreatic mass [K86.89]     Leukocytosis [D72.829]     Lumbar compression fracture, closed, initial encounter (HCC) [S32.000A]     Tobacco abuse counseling [Z71.6]     Unilateral complete BKA, right, subsequent encounter (HCC) [S88.111D]      Plan:  Likely has metastatic pancreas cancer.  Awaiting MRI to determine need for ERCP.  I suspect that his bilirubin is elevated secondary to underlying liver disease and hepatic replacement of tumor.    For operating room tomorrow for diagnostic laparoscopy and liver biopsy to obtain a diagnosis.    The patient is amenable to this plan.    Greater than 45 minutes were spent with the patient.  This included review of outside records and imaging, counseling of the patient and coordination of care.        Discussed patient condition with Hospitalist and Patient

## 2025-02-13 NOTE — PROGRESS NOTES
[facility] Medicine Daily Progress Note    Date of Service  2/13/2025    Chief Complaint  Telly Solomon is a 50 y.o. male admitted 2/12/2025 with abdominal pain.    Hospital Course    Telly Solomon is a 50 y.o. male with past medical history of sciatica, right BKA who presented 2/12/2025 with 2 days history of worsening abdominal pain associate with nausea and bloating, weight loss about 90 pound over 1 year, lost appetite.  He denies fever, diarrhea, chest pain, and weakness/numbness.  Reports following up with [facility] for ongoing chronic back pain management.  On arrival his vitals been stable, labs with leukocytosis, significantly elevated liver enzymes, elevated bilirubin around 9.4, hypercalcemia 10.6, lipase 755, CT abdomen pelvis noted with left perihilar mass, extensive hepatic metastasis, hypodense lesion in the head of pancreas, CT L-spine noted with endplate deformities of L1-L2 L4.  Patient will be admitted to the hospital for further evaluation and management for suspected pancreatic cancer with hepatic metastasis, acute pancreatitis, hypercalcemia.      Interval Problem Update  Patient reported some left shoulder pain overnight, having some difficulty raising left arm. Shoulder x ray negative for fracture or dislocation.  Left clavicle protruding more than right, clavicle x ray ordered.  Continue supportive care.  MRI abdomen shows numerous hepatic masses, pancreatic head mass, no pancreatic duct dilation, no CBD dilation, bilateral adrenal nodules.  MRI L spine results pending, abnormal lucencies seen on CT spine.  NPO at midnight, plan for ex lap and liver biopsy tomorrow.  LFTs elevated, likely due to liver infiltration from cancer. Monitor daily labs.  Continue IV fluids.      I have discussed this patient's plan of care and discharge plan at IDT rounds today with Case Management, Nursing, Nursing leadership, and other members of the IDT team.    Consultants/Specialty  Surgical  oncology    Code Status  Full Code    Disposition  The patient is not medically cleared for discharge to home or a post-acute facility.      I have placed the appropriate orders for post-discharge needs.    Review of Systems  Review of Systems   Constitutional:  Negative for chills and fever.   Eyes:  Negative for blurred vision and pain.   Respiratory:  Negative for cough and shortness of breath.    Cardiovascular:  Negative for chest pain, palpitations and leg swelling.   Gastrointestinal:  Positive for abdominal pain. Negative for nausea and vomiting.   Genitourinary:  Negative for dysuria and urgency.   Musculoskeletal:  Negative for back pain.   Skin:  Negative for itching and rash.   Neurological:  Negative for dizziness, sensory change, focal weakness and headaches.   Psychiatric/Behavioral:  Negative for substance abuse. The patient does not have insomnia.         Physical Exam  Temp:  [36.5 °C (97.7 °F)-36.7 °C (98.1 °F)] 36.5 °C (97.7 °F)  Pulse:  [75-89] 89  Resp:  [16-18] 18  BP: (111-139)/(66-71) 111/71  SpO2:  [91 %-94 %] 91 %    Physical Exam  Constitutional:       General: He is not in acute distress.     Appearance: He is not ill-appearing.   HENT:      Head: Normocephalic and atraumatic.      Right Ear: External ear normal.      Left Ear: External ear normal.      Mouth/Throat:      Pharynx: No oropharyngeal exudate or posterior oropharyngeal erythema.   Eyes:      Extraocular Movements: Extraocular movements intact.      Pupils: Pupils are equal, round, and reactive to light.   Cardiovascular:      Rate and Rhythm: Normal rate and regular rhythm.      Pulses: Normal pulses.      Heart sounds: Normal heart sounds.   Pulmonary:      Effort: Pulmonary effort is normal. No respiratory distress.      Breath sounds: Normal breath sounds. No wheezing.   Abdominal:      General: Bowel sounds are normal. There is no distension.      Palpations: Abdomen is soft.      Tenderness: There is no abdominal  tenderness. There is no guarding.   Musculoskeletal:         General: No swelling or tenderness.      Cervical back: Normal range of motion and neck supple.      Comments: R BKA   Skin:     General: Skin is warm and dry.   Neurological:      General: No focal deficit present.      Mental Status: He is oriented to person, place, and time.      Cranial Nerves: No cranial nerve deficit.      Sensory: No sensory deficit.      Motor: No weakness.   Psychiatric:         Mood and Affect: Mood normal.         Behavior: Behavior normal.         Fluids  No intake or output data in the 24 hours ending 02/13/25 1529     Laboratory  Recent Labs     02/12/25 0818 02/13/25  0455   WBC 11.7* 8.3   RBC 4.91 4.29*   HEMOGLOBIN 14.5 12.5*   HEMATOCRIT 40.6* 35.5*   MCV 82.7 82.8   MCH 29.5 29.1   MCHC 35.7 35.2   RDW 51.1* 50.4*   PLATELETCT 99* 76*   MPV 10.5 9.9     Recent Labs     02/12/25 0818 02/13/25 0455   SODIUM 136 134*   POTASSIUM 4.6 4.4   CHLORIDE 102 102   CO2 21 20   GLUCOSE 99 85   BUN 19 18   CREATININE 0.74 0.53   CALCIUM 10.0 9.4                   Imaging  [id number]-TOTOJOL-T/O   Final Result         1. Numerous hepatic masses, likely metastasis.      2. Mass in the head of the pancreas. No pancreatic duct dilatation.      3. No choledocholithiasis. No CBD dilatation.      4. Bilateral adrenal nodules, left more than right, concerning for metastasis.      5. Mildly enlarged perihepatic lymph nodes, likely metastasis.      6. Trace ascites.      DX-SHOULDER 2+ LEFT   Final Result         1.  No acute traumatic bony injury.   2.  Masslike consolidation in the left midlung, see CT chest yesterday for further characterization         CT-CHEST (THORAX) W/O   Final Result      1.  Large masslike airspace opacity in the lingula consistent with pneumonitis and/or neoplasm.      2.  Pronounced para-aortic adenopathy.      3.  There are 2-13 mm hyperdense left renal cysts. Bosniak IIF - The large majority of Bosniak IIF masses are  benign. When malignant, nearly all are indolent. Generally, Bosniak IIF masses are followed by imaging at 6 months and 12 months, then annually    for total of 5 years to assess for morphologic change.      4.  Hepatomegaly and mild splenomegaly.      5.  2.7 cm left adrenal mass.      Fleischner Society pulmonary nodule recommendations:   Not Applicable         CT-LSPINE W/O PLUS RECONS   Final Result      1. Mixed lucency and sclerosis involving the lumbar vertebral bodies, as well as the first sacral segment.   2. End plate deformities of the L1, L2 and L4 vertebral bodies are age-indeterminate. MRI of the lumbar spine without and with IV contrast is recommended.   3. Fracture of the left transverse process of the L3 vertebral body.      CT-ABDOMEN-PELVIS WITH   Final Result      1. Incompletely imaged left perihilar mass measuring 6.9 x 4.4 cm in diameter.   2. Extensive hepatic metastases.   3. Bilateral adrenal nodules, concerning for metastases.   4. Perihepatic lymphadenopathy.   5. Hypodense lesion in the head of the pancreas measuring 1 x 1.1 x 1.2 cm in diameter. Further evaluation with MRI of the abdomen with and without IV contrast with a pancreas protocol is recommended.   6. Wedge-shaped areas of hypodensity in the upper pole and interpolar region of the left kidney, not otherwise characterizable on this exam.   7. Patchy lucency and sclerosis in the lumbar vertebral bodies, with chronic appearing L1, L2 and L4 endplate deformities.      MR-LUMBAR SPINE-WITH & W/O    (Results Pending)   DX-CLAVICLE LEFT    (Results Pending)        Assessment/Plan  * Pancreatic mass  Assessment & Plan  CT abdomen pelvis noted with left perihilar mass, extensive hepatic metastasis, hypodense lesion in the head of pancreas.      MRCP shows pancreatic head mass, liver mets, adrenal nodules; no CBD or pancreatic duct dilation; defer GI consult for now  NPO at midnight, plan for ex lap and liver biopsy tomorrow  Appreciate  surgical oncology recommendations    Tobacco abuse counseling  Assessment & Plan  I spent 5 minutes on tobacco cessation education and counseling  Patient reports he is making provide an on Chantix  I discussed benefit of quitting smoking risks of continued smoking  Patient reports he will quit smoking  Have added nicotine patch      Lumbar compression fracture, closed, initial encounter (HCC)  Assessment & Plan  CT L-spine noted with endplate deformities of L1-L2 L4.  Patient has ongoing chronic back pain and orthopedics  The suspected mid to L-spine   I have ordered MRI L-spine with and without contrast,     Leukocytosis  Assessment & Plan  No concern for ongoing infection  Stress-induced   repeat labs in a.m.        Hypercalcemia  Assessment & Plan  Due to ongoing malignancy  Started on IV fluid  Repeat labs in a.m.    Acute pancreatitis  Assessment & Plan  Started on IV fluid  Zofran 4 mg IVP Q 6 H PRN for nauseas or vomiting   Pain management  serial abdominal exam        Unilateral complete BKA, right, subsequent encounter (HCC)- (present on admission)  Assessment & Plan  History of right BKA         VTE prophylaxis: VTE Selection    I have performed a physical exam and reviewed and updated ROS and Plan today (2/13/2025). In review of yesterday's note (2/12/2025), there are no changes except as documented above.

## 2025-02-13 NOTE — PROGRESS NOTES
NOC HOSPITALIST CROSS COVER    Notified by RN regarding patient reporting that he felt sudden onset of pain in his left shoulder while using the bathroom.  Patient is reporting limited range of motion however motor sensation circulation intact.  Shoulder x-ray ordered and is negative for acute bony abnormalities of the left shoulder.    -----------------------------------------------------------------------------------------------------------    Electronically signed by:  SIS Flores PA-C  Hospitalist Services

## 2025-02-13 NOTE — PROGRESS NOTES
Pt refusing bed alarm at this time.  Per this RN's discussion with Pt and observation of ambulation and behavior, Pt calls appropriately and demonstrates strong safety awareness with safe ambulation with SBA and FWW.  Pt's wife is bringing personal wheelchair this morning and Pt will be up self when that arrives. Pt agrees to call for any OOBA.

## 2025-02-13 NOTE — PROGRESS NOTES
Assumed care of patient at 0645. Bedside report received from Tiffanie WALKER. Assessment complete.  AA&Ox4. Denies CP/SOB.  Reporting 7/10 pain. Medicated per MAR.   Educated patient regarding pharmacologic and non pharmacologic modalities for pain management.  Skin per flowsheets  Tolerating NPO sips/chips diet. Denies N/V.  + void. Last BM 2/12  Pt ambulates with SBA and FWW  All needs met at this time. Call light within reach. Pt calls appropriately. Bed low and locked, non skid socks in place. Hourly rounding in place.

## 2025-02-13 NOTE — CARE PLAN
The patient is Stable - Low risk of patient condition declining or worsening    Shift Goals: Pain, Safety, Elimination, Mobility  Clinical Goals: Pain, Safety, Elimination, Mobilization  Patient Goals: Pain, POC  Family Goals: KOURTNEY    Progress made toward(s) clinical / shift goals:  Pt resting in bed, pain managed with current medication.  Pt remains free from falls and demonstrates safe ambulation with SBA and FWW.  This RN discussed POC and BTD with Pt, who verbalized understanding.

## 2025-02-13 NOTE — PROGRESS NOTES
Assumed care of patient at 1900. Bedside report received. Assessment complete.    A&O x4. Patient calls appropriately.  Reports 5/10 pain. Pain managed with prescribed medications per MAR.  Denies SOB. O2 sats >90% on RA  NPO. Denies N/V.  + void. + flatus. Last BM 2/12.  Skin per flowsheets.  R BKA.  Mobility: SBA FWW    Reviewed plan of care with patient. All needs met at this time. Call light and belongings within reach. Fall precautions in place. Hourly rounding in place.

## 2025-02-13 NOTE — CARE PLAN
The patient is Stable - Low risk of patient condition declining or worsening    Shift Goals: Pain, Mobility, MRI, POC  Clinical Goals: Pain, Mobility  Patient Goals: Pain, POC  Family Goals: KOURTNEY    Progress made toward(s) clinical / shift goals:  Pt resting in bed, pain managed with current medication.  Pt remains free from falls and demonstrates safe ambulation by self with wheelchair.  This RN discussed POC and BTD with Pt, who verbalized understanding.

## 2025-02-13 NOTE — PROGRESS NOTES
Patient refusing bed strip alarm. Fall risk education provided, patient still refusing strip alarm. Patient compliant with frame alarm. Charge RN notified.

## 2025-02-13 NOTE — CARE PLAN
Problem: Pain - Standard  Goal: Alleviation of pain or a reduction in pain to the patient’s comfort goal  Outcome: Progressing     Problem: Knowledge Deficit - Standard  Goal: Patient and family/care givers will demonstrate understanding of plan of care, disease process/condition, diagnostic tests and medications  Outcome: Progressing     Problem: Fall Risk  Goal: Patient will remain free from falls  Outcome: Progressing   The patient is Stable - Low risk of patient condition declining or worsening    Shift Goals  Clinical Goals: pain control, mobility  Patient Goals: pain control, sleep  Family Goals: KOURTNEY    Progress made toward(s) clinical / shift goals:  Pain managed per MAR with reported relief and intermittent sleep throughout night. Patient free from falls/injury at this time. NPO in place. Plan for MRI 2/13    Patient is not progressing towards the following goals:

## 2025-02-14 ENCOUNTER — ANESTHESIA (OUTPATIENT)
Dept: SURGERY | Facility: MEDICAL CENTER | Age: 50
DRG: 420 | End: 2025-02-14
Payer: COMMERCIAL

## 2025-02-14 LAB
CANCER AG19-9 SERPL-ACNC: 1716 U/ML (ref 0–35)
CEA SERPL-MCNC: 1369 NG/ML (ref 0–3)
PATHOLOGY CONSULT NOTE: NORMAL

## 2025-02-14 PROCEDURE — 0FB04ZX EXCISION OF LIVER, PERCUTANEOUS ENDOSCOPIC APPROACH, DIAGNOSTIC: ICD-10-PCS | Performed by: SURGERY

## 2025-02-14 PROCEDURE — 99232 SBSQ HOSP IP/OBS MODERATE 35: CPT | Performed by: STUDENT IN AN ORGANIZED HEALTH CARE EDUCATION/TRAINING PROGRAM

## 2025-02-14 PROCEDURE — 88342 IMHCHEM/IMCYTCHM 1ST ANTB: CPT | Mod: 26 | Performed by: PATHOLOGY

## 2025-02-14 PROCEDURE — 88307 TISSUE EXAM BY PATHOLOGIST: CPT | Performed by: PATHOLOGY

## 2025-02-14 PROCEDURE — 49320 DIAG LAPARO SEPARATE PROC: CPT | Performed by: SURGERY

## 2025-02-14 PROCEDURE — 88341 IMHCHEM/IMCYTCHM EA ADD ANTB: CPT | Mod: 91 | Performed by: PATHOLOGY

## 2025-02-14 PROCEDURE — 700111 HCHG RX REV CODE 636 W/ 250 OVERRIDE (IP): Performed by: STUDENT IN AN ORGANIZED HEALTH CARE EDUCATION/TRAINING PROGRAM

## 2025-02-14 PROCEDURE — 47001 NDL BIOPSY LVR TM OTH MAJ PX: CPT | Performed by: SURGERY

## 2025-02-14 PROCEDURE — 700101 HCHG RX REV CODE 250: Performed by: STUDENT IN AN ORGANIZED HEALTH CARE EDUCATION/TRAINING PROGRAM

## 2025-02-14 PROCEDURE — 82105 ALPHA-FETOPROTEIN SERUM: CPT

## 2025-02-14 PROCEDURE — 700105 HCHG RX REV CODE 258: Performed by: STUDENT IN AN ORGANIZED HEALTH CARE EDUCATION/TRAINING PROGRAM

## 2025-02-14 PROCEDURE — 160009 HCHG ANES TIME/MIN: Performed by: SURGERY

## 2025-02-14 PROCEDURE — 770001 HCHG ROOM/CARE - MED/SURG/GYN PRIV*

## 2025-02-14 PROCEDURE — 82378 CARCINOEMBRYONIC ANTIGEN: CPT

## 2025-02-14 PROCEDURE — 700102 HCHG RX REV CODE 250 W/ 637 OVERRIDE(OP): Performed by: STUDENT IN AN ORGANIZED HEALTH CARE EDUCATION/TRAINING PROGRAM

## 2025-02-14 PROCEDURE — 160002 HCHG RECOVERY MINUTES (STAT): Performed by: SURGERY

## 2025-02-14 PROCEDURE — 160039 HCHG SURGERY MINUTES - EA ADDL 1 MIN LEVEL 3: Performed by: SURGERY

## 2025-02-14 PROCEDURE — 88307 TISSUE EXAM BY PATHOLOGIST: CPT | Mod: 26 | Performed by: PATHOLOGY

## 2025-02-14 PROCEDURE — 86301 IMMUNOASSAY TUMOR CA 19-9: CPT

## 2025-02-14 PROCEDURE — 88342 IMHCHEM/IMCYTCHM 1ST ANTB: CPT | Performed by: PATHOLOGY

## 2025-02-14 PROCEDURE — 160035 HCHG PACU - 1ST 60 MINS PHASE I: Performed by: SURGERY

## 2025-02-14 PROCEDURE — 160028 HCHG SURGERY MINUTES - 1ST 30 MINS LEVEL 3: Performed by: SURGERY

## 2025-02-14 PROCEDURE — 160048 HCHG OR STATISTICAL LEVEL 1-5: Performed by: SURGERY

## 2025-02-14 PROCEDURE — 160015 HCHG STAT PREOP MINUTES: Performed by: SURGERY

## 2025-02-14 PROCEDURE — A9270 NON-COVERED ITEM OR SERVICE: HCPCS | Performed by: STUDENT IN AN ORGANIZED HEALTH CARE EDUCATION/TRAINING PROGRAM

## 2025-02-14 PROCEDURE — 700101 HCHG RX REV CODE 250: Performed by: SURGERY

## 2025-02-14 PROCEDURE — 88341 IMHCHEM/IMCYTCHM EA ADD ANTB: CPT | Mod: 26 | Performed by: PATHOLOGY

## 2025-02-14 RX ORDER — HYDROMORPHONE HYDROCHLORIDE 1 MG/ML
0.1 INJECTION, SOLUTION INTRAMUSCULAR; INTRAVENOUS; SUBCUTANEOUS
Status: DISCONTINUED | OUTPATIENT
Start: 2025-02-14 | End: 2025-02-14 | Stop reason: HOSPADM

## 2025-02-14 RX ORDER — METHOCARBAMOL 100 MG/ML
1000 INJECTION, SOLUTION INTRAMUSCULAR; INTRAVENOUS
Status: DISCONTINUED | OUTPATIENT
Start: 2025-02-14 | End: 2025-02-14 | Stop reason: HOSPADM

## 2025-02-14 RX ORDER — HYDROMORPHONE HYDROCHLORIDE 1 MG/ML
0.2 INJECTION, SOLUTION INTRAMUSCULAR; INTRAVENOUS; SUBCUTANEOUS
Status: DISCONTINUED | OUTPATIENT
Start: 2025-02-14 | End: 2025-02-14 | Stop reason: HOSPADM

## 2025-02-14 RX ORDER — BUPIVACAINE HYDROCHLORIDE AND EPINEPHRINE 5; 5 MG/ML; UG/ML
INJECTION, SOLUTION PERINEURAL
Status: DISCONTINUED | OUTPATIENT
Start: 2025-02-14 | End: 2025-02-14 | Stop reason: HOSPADM

## 2025-02-14 RX ORDER — SUCCINYLCHOLINE CHLORIDE 20 MG/ML
INJECTION INTRAMUSCULAR; INTRAVENOUS PRN
Status: DISCONTINUED | OUTPATIENT
Start: 2025-02-14 | End: 2025-02-14 | Stop reason: SURG

## 2025-02-14 RX ORDER — ROCURONIUM BROMIDE 10 MG/ML
INJECTION, SOLUTION INTRAVENOUS PRN
Status: DISCONTINUED | OUTPATIENT
Start: 2025-02-14 | End: 2025-02-14 | Stop reason: SURG

## 2025-02-14 RX ORDER — METOCLOPRAMIDE HYDROCHLORIDE 5 MG/ML
INJECTION INTRAMUSCULAR; INTRAVENOUS PRN
Status: DISCONTINUED | OUTPATIENT
Start: 2025-02-14 | End: 2025-02-14 | Stop reason: SURG

## 2025-02-14 RX ORDER — SODIUM CHLORIDE, SODIUM LACTATE, POTASSIUM CHLORIDE, CALCIUM CHLORIDE 600; 310; 30; 20 MG/100ML; MG/100ML; MG/100ML; MG/100ML
INJECTION, SOLUTION INTRAVENOUS
Status: DISCONTINUED | OUTPATIENT
Start: 2025-02-14 | End: 2025-02-14 | Stop reason: SURG

## 2025-02-14 RX ORDER — MIDAZOLAM HYDROCHLORIDE 1 MG/ML
INJECTION INTRAMUSCULAR; INTRAVENOUS PRN
Status: DISCONTINUED | OUTPATIENT
Start: 2025-02-14 | End: 2025-02-14 | Stop reason: SURG

## 2025-02-14 RX ORDER — ONDANSETRON 2 MG/ML
INJECTION INTRAMUSCULAR; INTRAVENOUS PRN
Status: DISCONTINUED | OUTPATIENT
Start: 2025-02-14 | End: 2025-02-14 | Stop reason: SURG

## 2025-02-14 RX ORDER — ONDANSETRON 2 MG/ML
4 INJECTION INTRAMUSCULAR; INTRAVENOUS
Status: DISCONTINUED | OUTPATIENT
Start: 2025-02-14 | End: 2025-02-14 | Stop reason: HOSPADM

## 2025-02-14 RX ORDER — DIPHENHYDRAMINE HYDROCHLORIDE 50 MG/ML
12.5 INJECTION INTRAMUSCULAR; INTRAVENOUS
Status: DISCONTINUED | OUTPATIENT
Start: 2025-02-14 | End: 2025-02-14 | Stop reason: HOSPADM

## 2025-02-14 RX ORDER — OXYCODONE HCL 5 MG/5 ML
5 SOLUTION, ORAL ORAL
Status: DISCONTINUED | OUTPATIENT
Start: 2025-02-14 | End: 2025-02-14 | Stop reason: HOSPADM

## 2025-02-14 RX ORDER — LIDOCAINE HYDROCHLORIDE 20 MG/ML
INJECTION, SOLUTION EPIDURAL; INFILTRATION; INTRACAUDAL; PERINEURAL PRN
Status: DISCONTINUED | OUTPATIENT
Start: 2025-02-14 | End: 2025-02-14 | Stop reason: SURG

## 2025-02-14 RX ORDER — ALBUTEROL SULFATE 5 MG/ML
2.5 SOLUTION RESPIRATORY (INHALATION)
Status: DISCONTINUED | OUTPATIENT
Start: 2025-02-14 | End: 2025-02-14 | Stop reason: HOSPADM

## 2025-02-14 RX ORDER — HALOPERIDOL 5 MG/ML
1 INJECTION INTRAMUSCULAR
Status: DISCONTINUED | OUTPATIENT
Start: 2025-02-14 | End: 2025-02-14 | Stop reason: HOSPADM

## 2025-02-14 RX ORDER — HYDROMORPHONE HYDROCHLORIDE 1 MG/ML
0.4 INJECTION, SOLUTION INTRAMUSCULAR; INTRAVENOUS; SUBCUTANEOUS
Status: DISCONTINUED | OUTPATIENT
Start: 2025-02-14 | End: 2025-02-14 | Stop reason: HOSPADM

## 2025-02-14 RX ORDER — PHENYLEPHRINE HCL IN 0.9% NACL 1 MG/10 ML
SYRINGE (ML) INTRAVENOUS PRN
Status: DISCONTINUED | OUTPATIENT
Start: 2025-02-14 | End: 2025-02-14 | Stop reason: SURG

## 2025-02-14 RX ORDER — HYDRALAZINE HYDROCHLORIDE 20 MG/ML
5 INJECTION INTRAMUSCULAR; INTRAVENOUS
Status: DISCONTINUED | OUTPATIENT
Start: 2025-02-14 | End: 2025-02-14 | Stop reason: HOSPADM

## 2025-02-14 RX ORDER — CEFAZOLIN SODIUM 1 G/3ML
INJECTION, POWDER, FOR SOLUTION INTRAMUSCULAR; INTRAVENOUS PRN
Status: DISCONTINUED | OUTPATIENT
Start: 2025-02-14 | End: 2025-02-14 | Stop reason: SURG

## 2025-02-14 RX ORDER — LABETALOL HYDROCHLORIDE 5 MG/ML
5 INJECTION, SOLUTION INTRAVENOUS
Status: DISCONTINUED | OUTPATIENT
Start: 2025-02-14 | End: 2025-02-14 | Stop reason: HOSPADM

## 2025-02-14 RX ORDER — OXYCODONE HCL 5 MG/5 ML
10 SOLUTION, ORAL ORAL
Status: DISCONTINUED | OUTPATIENT
Start: 2025-02-14 | End: 2025-02-14 | Stop reason: HOSPADM

## 2025-02-14 RX ORDER — DEXAMETHASONE SODIUM PHOSPHATE 4 MG/ML
INJECTION, SOLUTION INTRA-ARTICULAR; INTRALESIONAL; INTRAMUSCULAR; INTRAVENOUS; SOFT TISSUE PRN
Status: DISCONTINUED | OUTPATIENT
Start: 2025-02-14 | End: 2025-02-14 | Stop reason: SURG

## 2025-02-14 RX ORDER — EPHEDRINE SULFATE 50 MG/ML
5 INJECTION, SOLUTION INTRAVENOUS
Status: DISCONTINUED | OUTPATIENT
Start: 2025-02-14 | End: 2025-02-14 | Stop reason: HOSPADM

## 2025-02-14 RX ADMIN — OXYCODONE HYDROCHLORIDE 10 MG: 10 TABLET ORAL at 23:27

## 2025-02-14 RX ADMIN — GABAPENTIN 300 MG: 100 CAPSULE ORAL at 20:15

## 2025-02-14 RX ADMIN — SUCCINYLCHOLINE CHLORIDE 100 MG: 20 INJECTION, SOLUTION INTRAMUSCULAR; INTRAVENOUS at 07:58

## 2025-02-14 RX ADMIN — BACLOFEN 10 MG: 10 TABLET ORAL at 11:44

## 2025-02-14 RX ADMIN — METOCLOPRAMIDE HYDROCHLORIDE 20 MG: 5 INJECTION INTRAMUSCULAR; INTRAVENOUS at 07:52

## 2025-02-14 RX ADMIN — DEXAMETHASONE SODIUM PHOSPHATE 8 MG: 4 INJECTION INTRA-ARTICULAR; INTRALESIONAL; INTRAMUSCULAR; INTRAVENOUS; SOFT TISSUE at 08:02

## 2025-02-14 RX ADMIN — HYDROMORPHONE HYDROCHLORIDE 1 MG: 1 INJECTION, SOLUTION INTRAMUSCULAR; INTRAVENOUS; SUBCUTANEOUS at 04:52

## 2025-02-14 RX ADMIN — SODIUM CHLORIDE, POTASSIUM CHLORIDE, SODIUM LACTATE AND CALCIUM CHLORIDE: 600; 310; 30; 20 INJECTION, SOLUTION INTRAVENOUS at 07:51

## 2025-02-14 RX ADMIN — FENTANYL CITRATE 25 MCG: 50 INJECTION, SOLUTION INTRAMUSCULAR; INTRAVENOUS at 07:56

## 2025-02-14 RX ADMIN — OXYCODONE HYDROCHLORIDE 10 MG: 10 TABLET ORAL at 18:43

## 2025-02-14 RX ADMIN — ONDANSETRON 4 MG: 2 INJECTION INTRAMUSCULAR; INTRAVENOUS at 05:55

## 2025-02-14 RX ADMIN — ALBUTEROL SULFATE 2 PUFF: 90 AEROSOL, METERED RESPIRATORY (INHALATION) at 05:15

## 2025-02-14 RX ADMIN — FENTANYL CITRATE 25 MCG: 50 INJECTION, SOLUTION INTRAMUSCULAR; INTRAVENOUS at 07:54

## 2025-02-14 RX ADMIN — GABAPENTIN 300 MG: 100 CAPSULE ORAL at 12:53

## 2025-02-14 RX ADMIN — LIDOCAINE HYDROCHLORIDE 80 MG: 20 INJECTION, SOLUTION EPIDURAL; INFILTRATION; INTRACAUDAL; PERINEURAL at 07:58

## 2025-02-14 RX ADMIN — ALBUTEROL SULFATE 2 PUFF: 90 AEROSOL, METERED RESPIRATORY (INHALATION) at 11:44

## 2025-02-14 RX ADMIN — ROCURONIUM BROMIDE 20 MG: 50 INJECTION, SOLUTION INTRAVENOUS at 08:01

## 2025-02-14 RX ADMIN — BACLOFEN 10 MG: 10 TABLET ORAL at 05:55

## 2025-02-14 RX ADMIN — BUPROPION HYDROCHLORIDE 150 MG: 150 TABLET, FILM COATED, EXTENDED RELEASE ORAL at 04:32

## 2025-02-14 RX ADMIN — OXYCODONE HYDROCHLORIDE 10 MG: 10 TABLET ORAL at 12:53

## 2025-02-14 RX ADMIN — MIDAZOLAM HYDROCHLORIDE 1 MG: 1 INJECTION, SOLUTION INTRAMUSCULAR; INTRAVENOUS at 07:53

## 2025-02-14 RX ADMIN — FENTANYL CITRATE 50 MCG: 50 INJECTION, SOLUTION INTRAMUSCULAR; INTRAVENOUS at 07:58

## 2025-02-14 RX ADMIN — OXYCODONE HYDROCHLORIDE 10 MG: 10 TABLET ORAL at 09:55

## 2025-02-14 RX ADMIN — PROPOFOL 150 MG: 10 INJECTION, EMULSION INTRAVENOUS at 07:58

## 2025-02-14 RX ADMIN — BACLOFEN 10 MG: 10 TABLET ORAL at 18:43

## 2025-02-14 RX ADMIN — SUGAMMADEX 200 MG: 100 INJECTION, SOLUTION INTRAVENOUS at 08:26

## 2025-02-14 RX ADMIN — ALBUTEROL SULFATE 2 PUFF: 90 AEROSOL, METERED RESPIRATORY (INHALATION) at 09:55

## 2025-02-14 RX ADMIN — HYDROMORPHONE HYDROCHLORIDE 1 MG: 1 INJECTION, SOLUTION INTRAMUSCULAR; INTRAVENOUS; SUBCUTANEOUS at 11:44

## 2025-02-14 RX ADMIN — ONDANSETRON 4 MG: 2 INJECTION INTRAMUSCULAR; INTRAVENOUS at 07:52

## 2025-02-14 RX ADMIN — FENTANYL CITRATE 50 MCG: 50 INJECTION, SOLUTION INTRAMUSCULAR; INTRAVENOUS at 08:19

## 2025-02-14 RX ADMIN — CEFAZOLIN 2 G: 1 INJECTION, POWDER, FOR SOLUTION INTRAMUSCULAR; INTRAVENOUS at 08:00

## 2025-02-14 RX ADMIN — GABAPENTIN 300 MG: 100 CAPSULE ORAL at 04:32

## 2025-02-14 RX ADMIN — OXYCODONE HYDROCHLORIDE 10 MG: 10 TABLET ORAL at 03:43

## 2025-02-14 RX ADMIN — Medication 100 MCG: at 07:58

## 2025-02-14 RX ADMIN — OXYCODONE HYDROCHLORIDE 10 MG: 10 TABLET ORAL at 15:54

## 2025-02-14 ASSESSMENT — COGNITIVE AND FUNCTIONAL STATUS - GENERAL
SUGGESTED CMS G CODE MODIFIER DAILY ACTIVITY: CH
DAILY ACTIVITIY SCORE: 24
SUGGESTED CMS G CODE MODIFIER MOBILITY: CH
MOBILITY SCORE: 24

## 2025-02-14 ASSESSMENT — PAIN DESCRIPTION - PAIN TYPE
TYPE: ACUTE PAIN

## 2025-02-14 ASSESSMENT — LIFESTYLE VARIABLES: SUBSTANCE_ABUSE: 0

## 2025-02-14 ASSESSMENT — ENCOUNTER SYMPTOMS
DIZZINESS: 0
SHORTNESS OF BREATH: 0
INSOMNIA: 0
FEVER: 0
VOMITING: 0
COUGH: 0
BLURRED VISION: 0
PALPITATIONS: 0
EYE PAIN: 0
CHILLS: 0
ABDOMINAL PAIN: 1
BACK PAIN: 0
HEADACHES: 0
SENSORY CHANGE: 0
NAUSEA: 0
FOCAL WEAKNESS: 0

## 2025-02-14 NOTE — ANESTHESIA PROCEDURE NOTES
Airway    Date/Time: 2/14/2025 8:00 AM    Performed by: Bronwyn Sears M.D.  Authorized by: Bronwyn Sears M.D.    Location:  OR  Urgency:  Elective  Indications for Airway Management:  Anesthesia      Spontaneous Ventilation: absent    Sedation Level:  Deep  Preoxygenated: Yes    Patient Position:  Sniffing  Mask Difficulty Assessment:  0 - not attempted  Final Airway Type:  Endotracheal airway  Final Endotracheal Airway:  ETT  Cuffed: Yes    Technique Used for Successful ETT Placement:  Video laryngoscopy    Insertion Site:  Oral  Blade Type:  Glide  Laryngoscope Blade/Videolaryngoscope Blade Size:  4  ETT Size (mm):  7.0  Measured from:  Teeth  ETT to Teeth (cm):  22  Placement Verified by: auscultation and capnometry    Cormack-Lehane Classification:  Grade I - full view of glottis  Number of Attempts at Approach:  1

## 2025-02-14 NOTE — CARE PLAN
Problem: Pain - Standard  Goal: Alleviation of pain or a reduction in pain to the patient’s comfort goal  Outcome: Progressing     Problem: Fall Risk  Goal: Patient will remain free from falls  Outcome: Progressing     Problem: Mobility  Goal: Patient's capacity to carry out activities will improve  Outcome: Progressing     The patient is Stable - Low risk of patient condition declining or worsening    Shift Goals  Clinical Goals: pain control, safely mobilize  Patient Goals: pain control, comfort  Family Goals: KOURTNEY    Progress made toward(s) clinical / shift goals:  Pain managed per MAR with reported relief and sleep throughout night. Patient safely mobilizing from bed to wheelchair. NPO since 0000 for biopsy today. Patient A&Ox4, but demonstrates forgetfulness and slight confusion at times.    Patient is not progressing towards the following goals:

## 2025-02-14 NOTE — PROGRESS NOTES
Assumed care of patient at 0645. Bedside report received from Tiffanie WALKER. Assessment complete.  AA&Ox4. Denies CP/SOB.  Reporting 8/10 pain.  Declined intervention at this time.  Educated patient regarding pharmacologic and non pharmacologic modalities for pain management.  Skin per flowsheets  Tolerating NPO diet. Denies N/V.  + void. Last BM 2/12  Pt ambulates with SBA and Wheelchair  All needs met at this time. Call light within reach. Pt calls appropriately. Bed low and locked, non skid socks in place. Hourly rounding in place.

## 2025-02-14 NOTE — PROGRESS NOTES
[facility] Medicine Daily Progress Note    Date of Service  2/14/2025    Chief Complaint  Telly Solomon is a 50 y.o. male admitted 2/12/2025 with abdominal pain.    Hospital Course    Telly Solomon is a 50 y.o. male with past medical history of sciatica, right BKA who presented 2/12/2025 with 2 days history of worsening abdominal pain associate with nausea and bloating, weight loss about 90 pound over 1 year, lost appetite.  He denies fever, diarrhea, chest pain, and weakness/numbness.  Reports following up with [facility] for ongoing chronic back pain management.  On arrival his vitals been stable, labs with leukocytosis, significantly elevated liver enzymes, elevated bilirubin around 9.4, hypercalcemia 10.6, lipase 755, CT abdomen pelvis noted with left perihilar mass, extensive hepatic metastasis, hypodense lesion in the head of pancreas, CT L-spine noted with endplate deformities of L1-L2 L4.  Patient will be admitted to the hospital for further evaluation and management for suspected pancreatic cancer with hepatic metastasis, acute pancreatitis, hypercalcemia.      Interval Problem Update  No acute events overnight.  S/p liver biopsies today by surgical oncology team. Doing well post biopsy. He has some abdominal discomfort.  Discussed MRI results with patient including pancreatic head mass, likely liver metastasis, spinal metastasis. No signs of biliary duct or pancreatic duct dilation.  Clavicle x ray shows normal bone findings, 4th rib fracture.  Will monitor overnight, follow LFTs.  If stable, anticipate discharge to home tomorrow with close follow up with surgical oncology.      I have discussed this patient's plan of care and discharge plan at IDT rounds today with Case Management, Nursing, Nursing leadership, and other members of the IDT team.    Consultants/Specialty  Surgical oncology    Code Status  Full Code    Disposition  Medically Cleared  I have placed the appropriate orders for  post-discharge needs.    Review of Systems  Review of Systems   Constitutional:  Negative for chills and fever.   Eyes:  Negative for blurred vision and pain.   Respiratory:  Negative for cough and shortness of breath.    Cardiovascular:  Negative for chest pain, palpitations and leg swelling.   Gastrointestinal:  Positive for abdominal pain. Negative for nausea and vomiting.   Genitourinary:  Negative for dysuria and urgency.   Musculoskeletal:  Negative for back pain.   Skin:  Negative for itching and rash.   Neurological:  Negative for dizziness, sensory change, focal weakness and headaches.   Psychiatric/Behavioral:  Negative for substance abuse. The patient does not have insomnia.         Physical Exam  Temp:  [35.8 °C (96.5 °F)-37.2 °C (98.9 °F)] 36.8 °C (98.2 °F)  Pulse:  [] 102  Resp:  [12-18] 16  BP: (114-151)/(60-84) 151/74  SpO2:  [90 %-97 %] 92 %    Physical Exam  Constitutional:       General: He is not in acute distress.     Appearance: He is not ill-appearing.   HENT:      Head: Normocephalic and atraumatic.      Right Ear: External ear normal.      Left Ear: External ear normal.      Mouth/Throat:      Pharynx: No oropharyngeal exudate or posterior oropharyngeal erythema.   Eyes:      Extraocular Movements: Extraocular movements intact.      Pupils: Pupils are equal, round, and reactive to light.   Cardiovascular:      Rate and Rhythm: Normal rate and regular rhythm.      Pulses: Normal pulses.      Heart sounds: Normal heart sounds.   Pulmonary:      Effort: Pulmonary effort is normal. No respiratory distress.      Breath sounds: Normal breath sounds. No wheezing.   Abdominal:      General: Bowel sounds are normal. There is no distension.      Palpations: Abdomen is soft.      Tenderness: There is no abdominal tenderness. There is no guarding.   Musculoskeletal:         General: No swelling or tenderness.      Cervical back: Normal range of motion and neck supple.      Comments: EBENEZER MORRIS    Skin:     General: Skin is warm and dry.   Neurological:      General: No focal deficit present.      Mental Status: He is oriented to person, place, and time.      Cranial Nerves: No cranial nerve deficit.      Sensory: No sensory deficit.      Motor: No weakness.   Psychiatric:         Mood and Affect: Mood normal.         Behavior: Behavior normal.         Fluids    Intake/Output Summary (Last 24 hours) at 2/14/2025 1238  Last data filed at 2/14/2025 0843  Gross per 24 hour   Intake 500 ml   Output 80 ml   Net 420 ml        Laboratory  Recent Labs     02/12/25  0818 02/13/25  0455   WBC 11.7* 8.3   RBC 4.91 4.29*   HEMOGLOBIN 14.5 12.5*   HEMATOCRIT 40.6* 35.5*   MCV 82.7 82.8   MCH 29.5 29.1   MCHC 35.7 35.2   RDW 51.1* 50.4*   PLATELETCT 99* 76*   MPV 10.5 9.9     Recent Labs     02/12/25  0818 02/13/25  0455   SODIUM 136 134*   POTASSIUM 4.6 4.4   CHLORIDE 102 102   CO2 21 20   GLUCOSE 99 85   BUN 19 18   CREATININE 0.74 0.53   CALCIUM 10.0 9.4                   Imaging  DX-CLAVICLE LEFT   Final Result      Left fourth rib fracture.      MR-LUMBAR SPINE-WITH & W/O   Final Result      1.  Multifocal osseous metastatic disease.   2.  L2 ventral epidural enhancing mass causing severe spinal stenosis with cauda equina nerve compression.   3.   Small left paracentral ventral epidural tumor at the L3 level with mild left lateral recess stenosis.   4.  Mild presumed pathologic compression deformities of L1 and L2 and indeterminate chronic L4 compression deformity.         KL-[id number]-T/O   Final Result         1. Numerous hepatic masses, likely metastasis.      2. Mass in the head of the pancreas. No pancreatic duct dilatation.      3. No choledocholithiasis. No CBD dilatation.      4. Bilateral adrenal nodules, left more than right, concerning for metastasis.      5. Mildly enlarged perihepatic lymph nodes, likely metastasis.      6. Trace ascites.      DX-SHOULDER 2+ LEFT   Final Result         1.  No acute  traumatic bony injury.   2.  Masslike consolidation in the left midlung, see CT chest yesterday for further characterization         CT-CHEST (THORAX) W/O   Final Result      1.  Large masslike airspace opacity in the lingula consistent with pneumonitis and/or neoplasm.      2.  Pronounced para-aortic adenopathy.      3.  There are 2-13 mm hyperdense left renal cysts. Bosniak IIF - The large majority of Bosniak IIF masses are benign. When malignant, nearly all are indolent. Generally, Bosniak IIF masses are followed by imaging at 6 months and 12 months, then annually    for total of 5 years to assess for morphologic change.      4.  Hepatomegaly and mild splenomegaly.      5.  2.7 cm left adrenal mass.      Fleischner Society pulmonary nodule recommendations:   Not Applicable         CT-LSPINE W/O PLUS RECONS   Final Result      1. Mixed lucency and sclerosis involving the lumbar vertebral bodies, as well as the first sacral segment.   2. End plate deformities of the L1, L2 and L4 vertebral bodies are age-indeterminate. MRI of the lumbar spine without and with IV contrast is recommended.   3. Fracture of the left transverse process of the L3 vertebral body.      CT-ABDOMEN-PELVIS WITH   Final Result      1. Incompletely imaged left perihilar mass measuring 6.9 x 4.4 cm in diameter.   2. Extensive hepatic metastases.   3. Bilateral adrenal nodules, concerning for metastases.   4. Perihepatic lymphadenopathy.   5. Hypodense lesion in the head of the pancreas measuring 1 x 1.1 x 1.2 cm in diameter. Further evaluation with MRI of the abdomen with and without IV contrast with a pancreas protocol is recommended.   6. Wedge-shaped areas of hypodensity in the upper pole and interpolar region of the left kidney, not otherwise characterizable on this exam.   7. Patchy lucency and sclerosis in the lumbar vertebral bodies, with chronic appearing L1, L2 and L4 endplate deformities.           Assessment/Plan  * Pancreatic  mass  Assessment & Plan  CT abdomen pelvis noted with left perihilar mass, extensive hepatic metastasis, hypodense lesion in the head of pancreas.      MRCP shows pancreatic head mass, liver mets, adrenal nodules; no CBD or pancreatic duct dilation; defer GI consult for now  S/p liver biopsy today, path pending  Follow up surgical oncology outpatient  Appreciate surgical oncology recommendations    Tobacco abuse counseling  Assessment & Plan  I spent 5 minutes on tobacco cessation education and counseling  Patient reports he is making provide an on Chantix  I discussed benefit of quitting smoking risks of continued smoking  Patient reports he will quit smoking  Have added nicotine patch      Lumbar compression fracture, closed, initial encounter ([facility])  Assessment & Plan  CT L-spine noted with endplate deformities of L1-L2 L4.  Patient has ongoing chronic back pain and orthopedics  The suspected mid to L-spine   MRI L spine shows osseous metastasis    Leukocytosis  Assessment & Plan  No concern for ongoing infection  Stress-induced   repeat labs in a.m.        Hypercalcemia  Assessment & Plan  Due to ongoing malignancy  Improved with IV fluids    Acute pancreatitis  Assessment & Plan  Improved  Stop IV fluids  Zofran 4 mg IVP Q 6 H PRN for nauseas or vomiting   Pain management  serial abdominal exam        Unilateral complete BKA, right, subsequent encounter ([facility])- (present on admission)  Assessment & Plan  History of right BKA         VTE prophylaxis: VTE Selection    I have performed a physical exam and reviewed and updated ROS and Plan today (2/14/2025). In review of yesterday's note (2/13/2025), there are no changes except as documented above.

## 2025-02-14 NOTE — ANESTHESIA PREPROCEDURE EVALUATION
Case: [id number] Date/Time: 02/14/25 0745    Procedure: BIOPSY, LIVER, LAPAROSCOPIC    Location: [facility] OR 15 / SURGERY [facility]    Surgeons: Vance Sharpe M.D.            50M suspected pancreatic ca w/extensive hepatic metastasis, acute pancreatitis, hypercalcemia, sciatica, chronic pain    Hb 12.5, PLTs 76  K 4.4, Na 134, Ca 10.4  AST ALT 200s    Pt jaundiced, telangectasias, zoning out, nauseous but no vomiting or GERD    Relevant Problems   Other   (positive) Lumbar compression fracture, closed, initial encounter ([facility])   (positive) Other chronic pain   (positive) Pancreatic mass   (positive) Tobacco abuse counseling   (positive) Unilateral complete BKA, right, subsequent encounter ([facility])       Physical Exam    Airway   Mallampati: II  TM distance: >3 FB  Neck ROM: full       Cardiovascular - normal exam  Rhythm: regular  Rate: normal  (-) murmur     Dental - normal exam        Facial Hair   Pulmonary - normal exam     Abdominal    Neurological - normal exam                   Anesthesia Plan    ASA 3   ASA physical status 3 criteria: other (comment)    Plan - general       Airway plan will be ETT          Induction: intravenous    Postoperative Plan: Postoperative administration of opioids is intended.    Pertinent diagnostic labs and testing reviewed    Informed Consent:    Anesthetic plan and risks discussed with patient.    Use of blood products discussed with: patient whom consented to blood products.

## 2025-02-14 NOTE — OP REPORT
Date of Operation: February 14th, 2025    Preoperative Diagnosis: Metastatic cancer     Postoperative Diagnosis: Same    Operative Procedure: Diagnostic laparoscopy with liver biopsy    Surgeon: Seb    Assisting Surgeon:      Anesthesia: General     Estimated Blood Loss: Minimal    Specimens: Multiple liver biopsies, including core  biopsies    Drains: None    Findings: Enlarged cirrhotic appearing liver with multiple visible liver masses.     Counts: Sponge, needle, and instrument counts were reported correct at the conclusion of the operation x2.     Indications:    The patient is a very pleasant 50-year-old gentleman who presented to the emergency room with abdominal pain.  He was found to have multiple liver metastasis and a small pancreatic mass.  He was taken to the operating room for the above said procedure.  He understood the nature and risks of this and wished to proceed.    DESCRIPTION OF PROCEDURE   The patient was placed supine on the operating table. Pressure points were padded. Sequential compression garments were applied to the lower extremities. A time out was performed.  General anesthesia was administered.  The patient's abdomen was prepped and draped in the usual sterile fashion.  A small infraumbilical incision was made and a Veress needle passed into the patient's peritoneal cavity.    Drop test was performed.  Insufflation was commenced.  A 5 mm trocar was then placed and the patient's peritoneal cavity examined.  There were numerous visible liver metastasis and the patient's liver itself was quite enlarged and came down almost to his umbilicus.  There was no evidence of peritoneal disease.    Several liver biopsies were taken using the biopsy forceps followed by several core needle biopsies in 3 separate lesions.  Hemostasis was controlled using cautery.  After ensuring that adequate hemostasis was obtained, trocars removed.  Skin was closed using 4-0 Monocryl and glue.     The patient  was then awakened and transferred to PACU in stable condition.     Complications: None noted

## 2025-02-14 NOTE — PROGRESS NOTES
Assumed care of patient at 1900. Bedside report received. Assessment complete.    A&O x4. Patient calls appropriately.  Reports 5/10 pain. Pain managed with prescribed medications per MAR.  Denies SOB. O2 sats >90% on RA  Tolerating clear liquid diet. NPO at 0000. Denies N/V.  + void. + flatus. Last BM 2/13.  Skin per flowsheets  Mobility: mobilizes independently with wheelchair    Reviewed plan of care with patient. All needs met at this time. Call light and belongings within reach. Fall precautions in place. Hourly rounding in place.

## 2025-02-14 NOTE — PROGRESS NOTES
"/74   Pulse 77   Temp 36.4 °C (97.5 °F) (Temporal)   Resp 14   Ht 1.778 m (5' 10\")   Wt 81.6 kg (179 lb 14.3 oz)   SpO2 91%   BMI 25.81 kg/m²       Patient reports pain at 8 on a scale of 0-10. Educated patient regarding pharmacologic and non pharmacologic modalities for pain management. Oriented to room call light and smoking policy.  Reviewed plan of care (equipment, incentive spirometer, sequential compression devices, medications, activity, diet, fall precautions, skin care, and pain) with patient and family. Welcome packet given and reviewed with patient, all questions answered. Education provided on oral hygiene program.    AA&Ox4. Denies CP/SOB.  See 2 RN skin note  Tolerating NPO diet. Denies N/V.  + void. Last BM 2/12  Pt ambulates with SBA and Wheelchair  All needs met at this time. Call light within reach. Pt calls appropriately. Bed low and locked, non skid socks in place. Hourly rounding in place.    "

## 2025-02-14 NOTE — PROGRESS NOTES
Patient scores as a moderate fall risk. Educated patient on fall risk precautions and use of bed alarm. Patient refusing bed alarm at this time. Patient A&Ox4 and has demonstrated ability to safely mobilize with his personal wheelchair independently. Patient verbalizes that he will call for assistance when needed. Call light and personal belongings within reach. Charge RN notified.

## 2025-02-14 NOTE — OR NURSING
Patient awake and alert and tolerating water without issue. VSS. Pt lap sites are CDI. Pt denies pain and nausea at this time.     Pt wife called and updated on pt status.     Report called to Gurwinder WALKER. All pertinent pt information has been discussed.     Pt transported to room in stable condition.

## 2025-02-14 NOTE — ANESTHESIA TIME REPORT
Anesthesia Start and Stop Event Times       Date Time Event    2/14/2025 0729 Ready for Procedure     0751 Anesthesia Start     0841 Anesthesia Stop          Responsible Staff  02/14/25      Name Role Begin End    Bronwyn Sears M.D. Anesth 0751 0841          Overtime Reason:  no overtime (within assigned shift)    Comments:

## 2025-02-14 NOTE — PROGRESS NOTES
4 Eyes Skin Assessment Completed by DENISE Purdy and DENISE Resendiz.    Head WDL  Ears WDL  Nose WDL  Mouth WDL  Neck WDL  Breast/Chest Scar  Shoulder Blades WDL  Spine WDL  (R) Arm/Elbow/Hand Scar  (L) Arm/Elbow/Hand Scar, Missing Fingers  Abdomen 3x Lap sites  Groin WDL  Scrotum/Coccyx/Buttocks Redness and Blanching  (R) Leg BKA  (L) Leg WDL  (R) Heel/Foot/Toe BKA  (L) Heel/Foot/Toe WDL          Devices In Places Blood Pressure Cuff, Pulse Ox, and Nasal Cannula      Interventions In Place NC W/Ear Foams, Pillows, Low Air Loss Mattress, and Heels Loaded W/Pillows    Possible Skin Injury No    Pictures Uploaded Into Epic N/A  Wound Consult Placed N/A  RN Wound Prevention Protocol Ordered No

## 2025-02-15 ENCOUNTER — PHARMACY VISIT (OUTPATIENT)
Dept: PHARMACY | Facility: MEDICAL CENTER | Age: 50
End: 2025-02-15
Payer: COMMERCIAL

## 2025-02-15 VITALS
DIASTOLIC BLOOD PRESSURE: 69 MMHG | SYSTOLIC BLOOD PRESSURE: 114 MMHG | WEIGHT: 179.9 LBS | OXYGEN SATURATION: 91 % | TEMPERATURE: 97 F | HEART RATE: 84 BPM | RESPIRATION RATE: 16 BRPM | BODY MASS INDEX: 25.75 KG/M2 | HEIGHT: 70 IN

## 2025-02-15 LAB
AFP-TM SERPL-MCNC: 3 NG/ML (ref 0–9)
ALBUMIN SERPL BCP-MCNC: 2.5 G/DL (ref 3.2–4.9)
ALBUMIN/GLOB SERPL: 0.8 G/DL
ALP SERPL-CCNC: 610 U/L (ref 30–99)
ALT SERPL-CCNC: 260 U/L (ref 2–50)
ANION GAP SERPL CALC-SCNC: 11 MMOL/L (ref 7–16)
AST SERPL-CCNC: 274 U/L (ref 12–45)
BILIRUB SERPL-MCNC: 9.1 MG/DL (ref 0.1–1.5)
BUN SERPL-MCNC: 14 MG/DL (ref 8–22)
CALCIUM ALBUM COR SERPL-MCNC: 10.2 MG/DL (ref 8.5–10.5)
CALCIUM SERPL-MCNC: 9 MG/DL (ref 8.5–10.5)
CHLORIDE SERPL-SCNC: 101 MMOL/L (ref 96–112)
CO2 SERPL-SCNC: 20 MMOL/L (ref 20–33)
CREAT SERPL-MCNC: 0.53 MG/DL (ref 0.5–1.4)
GFR SERPLBLD CREATININE-BSD FMLA CKD-EPI: 121 ML/MIN/1.73 M 2
GLOBULIN SER CALC-MCNC: 3.1 G/DL (ref 1.9–3.5)
GLUCOSE SERPL-MCNC: 136 MG/DL (ref 65–99)
POTASSIUM SERPL-SCNC: 4.5 MMOL/L (ref 3.6–5.5)
PROT SERPL-MCNC: 5.6 G/DL (ref 6–8.2)
SODIUM SERPL-SCNC: 132 MMOL/L (ref 135–145)

## 2025-02-15 PROCEDURE — 80053 COMPREHEN METABOLIC PANEL: CPT

## 2025-02-15 PROCEDURE — 99239 HOSP IP/OBS DSCHRG MGMT >30: CPT | Performed by: STUDENT IN AN ORGANIZED HEALTH CARE EDUCATION/TRAINING PROGRAM

## 2025-02-15 PROCEDURE — 700111 HCHG RX REV CODE 636 W/ 250 OVERRIDE (IP): Performed by: STUDENT IN AN ORGANIZED HEALTH CARE EDUCATION/TRAINING PROGRAM

## 2025-02-15 PROCEDURE — RXMED WILLOW AMBULATORY MEDICATION CHARGE: Performed by: STUDENT IN AN ORGANIZED HEALTH CARE EDUCATION/TRAINING PROGRAM

## 2025-02-15 PROCEDURE — A9270 NON-COVERED ITEM OR SERVICE: HCPCS | Performed by: STUDENT IN AN ORGANIZED HEALTH CARE EDUCATION/TRAINING PROGRAM

## 2025-02-15 PROCEDURE — 700102 HCHG RX REV CODE 250 W/ 637 OVERRIDE(OP): Performed by: STUDENT IN AN ORGANIZED HEALTH CARE EDUCATION/TRAINING PROGRAM

## 2025-02-15 RX ORDER — OXYCODONE HYDROCHLORIDE 10 MG/1
10 TABLET ORAL EVERY 6 HOURS PRN
Qty: 56 TABLET | Refills: 0 | Status: SHIPPED | OUTPATIENT
Start: 2025-02-15 | End: 2025-03-01

## 2025-02-15 RX ADMIN — ALBUTEROL SULFATE 2 PUFF: 90 AEROSOL, METERED RESPIRATORY (INHALATION) at 09:23

## 2025-02-15 RX ADMIN — OXYCODONE HYDROCHLORIDE 10 MG: 10 TABLET ORAL at 07:48

## 2025-02-15 RX ADMIN — BUPROPION HYDROCHLORIDE 150 MG: 150 TABLET, FILM COATED, EXTENDED RELEASE ORAL at 04:02

## 2025-02-15 RX ADMIN — GABAPENTIN 300 MG: 100 CAPSULE ORAL at 04:02

## 2025-02-15 RX ADMIN — OXYCODONE HYDROCHLORIDE 10 MG: 10 TABLET ORAL at 02:31

## 2025-02-15 RX ADMIN — ALBUTEROL SULFATE 2 PUFF: 90 AEROSOL, METERED RESPIRATORY (INHALATION) at 03:55

## 2025-02-15 RX ADMIN — HYDROMORPHONE HYDROCHLORIDE 1 MG: 1 INJECTION, SOLUTION INTRAMUSCULAR; INTRAVENOUS; SUBCUTANEOUS at 09:26

## 2025-02-15 ASSESSMENT — PAIN DESCRIPTION - PAIN TYPE
TYPE: ACUTE PAIN

## 2025-02-15 NOTE — DISCHARGE SUMMARY
Discharge Summary    CHIEF COMPLAINT ON ADMISSION  Chief Complaint   Patient presents with    Abdominal Pain    Nausea       Reason for Admission  RLQ Pain     Admission Date  2/12/2025    CODE STATUS  Prior    HPI & HOSPITAL COURSE    Telly Solomon is a 50 y.o. male with past medical history of sciatica, right BKA who presented 2/12/2025 with 2 days history of worsening abdominal pain associate with nausea and bloating, weight loss about 90 pound over 1 year, lost appetite.  He denies fever, diarrhea, chest pain, and weakness/numbness.  Reports following up with [facility] for ongoing chronic back pain management.  On arrival his vitals been stable, labs with leukocytosis, significantly elevated liver enzymes, elevated bilirubin around 9.4, hypercalcemia 10.6, lipase 755, CT abdomen pelvis noted with left perihilar mass, extensive hepatic metastasis, hypodense lesion in the head of pancreas, CT L-spine noted with endplate deformities of L1-L2 L4. MRI shows similar to CT findings of pancreatic head mass, liver metastasis, adrenal nodules, lingula mass, spinal metastasis. No biliary duct or pancreatic duct dilation/obstruction seen. Patient underwent diagnostic laparoscopy with liver biopsies by surgical oncology team, results pending at time of discharge. CEA and  elevated. Patient medically stable, he is discharged to home with close outpatient follow up. He is to follow up with surgical oncology team next week to discuss biopsy results and plan of care. He is discharged with pain medication, he is to follow up with his chronic pan .    Therefore, he is discharged in fair and stable condition to home with close outpatient follow-up.    The patient met 2-midnight criteria for an inpatient stay at the time of discharge.    Discharge Date  2/15/2025    FOLLOW UP ITEMS POST DISCHARGE  Take medications as prescribed.  Follow up with surgical oncology, PCP, pain  management.    DISCHARGE DIAGNOSES  Principal Problem:    Pancreatic mass (POA: Unknown)  Active Problems:    Unilateral complete BKA, right, subsequent encounter ([facility]) (POA: Yes)    Acute pancreatitis (POA: Unknown)    Hypercalcemia (POA: Unknown)    Leukocytosis (POA: Unknown)    Lumbar compression fracture, closed, initial encounter ([facility]) (POA: Unknown)    Tobacco abuse counseling (POA: Unknown)  Resolved Problems:    * No resolved hospital problems. *      FOLLOW UP  Future Appointments   Date Time Provider Department Center   3/6/2025  2:00 PM Humberto Johnson P.A.-C. [facility] [address]     Panchito Grigsby M.D.  [address] [place] [address] [address]  [place] [address]  [phone]    Follow up in 1 week(s)      Hayder Lal M.D.  [address] [facility] [address]  [place] [id number] [phone]    Follow up in 1 week(s)        MEDICATIONS ON DISCHARGE     Medication List        START taking these medications        Instructions   oxyCODONE immediate release 10 MG immediate release tablet  Commonly known as: Roxicodone   Take 1 Tablet by mouth every 6 hours as needed for Severe Pain for up to 14 days.  Dose: 10 mg            CHANGE how you take these medications        Instructions   gabapentin 100 MG Caps  What changed: Another medication with the same name was removed. Continue taking this medication, and follow the directions you see here.  Commonly known as: Neurontin   Take 3 Capsules by mouth 3 times a day for 90 days.  Dose: 300 mg     meloxicam 15 MG tablet  What changed:   how much to take  how to take this  when to take this  Commonly known as: Mobic   Doctor's comments: Start after Medrol dose heaven is finished  1 tablet PO daily with food. No advil/aleve/motrin/ibuprofen on same day.            CONTINUE taking these medications        Instructions   albuterol 108 (90 Base) MCG/ACT Aers inhalation aerosol   Doctor's comments: Give albuterol that is patient or insurance preference please  Inhale 2 Puffs  "every four hours as needed for Shortness of Breath.  Dose: 2 Puff     baclofen 5 MG Tabs  Commonly known as: Lioresal   Take 1-2 Tablets by mouth 3 times a day as needed (spasm).  Dose: 5-10 mg     HYDROcodone-acetaminophen 5-325 MG Tabs per tablet  Commonly known as: Norco   Take 1 Tablet by mouth 3 times a day.  Dose: 1 Tablet            STOP taking these medications      cyclobenzaprine 5 mg tablet  Commonly known as: Flexeril     methylPREDNISolone 4 MG Tbpk  Commonly known as: Medrol Dosepak              Allergies  Allergies   Allergen Reactions    Morphine Unspecified     \"makes me weird\" confussion    Other Reaction(s): wild dreams       DIET  No orders of the defined types were placed in this encounter.      ACTIVITY  As tolerated.  Weight bearing as tolerated    CONSULTATIONS  Surgical oncology    PROCEDURES  Date of Operation: February 14th, 2025     Preoperative Diagnosis: Metastatic cancer     Postoperative Diagnosis: Same     Operative Procedure: Diagnostic laparoscopy with liver biopsy     Surgeon: Seb     Assisting Surgeon:       Anesthesia: General     Estimated Blood Loss: Minimal     Specimens: Multiple liver biopsies, including core  biopsies     Drains: None     Findings: Enlarged cirrhotic appearing liver with multiple visible liver masses.     Counts: Sponge, needle, and instrument counts were reported correct at the conclusion of the operation x2.      Indications:     The patient is a very pleasant 50-year-old gentleman who presented to the emergency room with abdominal pain.  He was found to have multiple liver metastasis and a small pancreatic mass.  He was taken to the operating room for the above said procedure.  He understood the nature and risks of this and wished to proceed.     DESCRIPTION OF PROCEDURE   The patient was placed supine on the operating table. Pressure points were padded. Sequential compression garments were applied to the lower extremities. A time out was " performed.  General anesthesia was administered.  The patient's abdomen was prepped and draped in the usual sterile fashion.  A small infraumbilical incision was made and a Veress needle passed into the patient's peritoneal cavity.     Drop test was performed.  Insufflation was commenced.  A 5 mm trocar was then placed and the patient's peritoneal cavity examined.  There were numerous visible liver metastasis and the patient's liver itself was quite enlarged and came down almost to his umbilicus.  There was no evidence of peritoneal disease.     Several liver biopsies were taken using the biopsy forceps followed by several core needle biopsies in 3 separate lesions.  Hemostasis was controlled using cautery.  After ensuring that adequate hemostasis was obtained, trocars removed.  Skin was closed using 4-0 Monocryl and glue.      The patient was then awakened and transferred to PACU in stable condition.     LABORATORY  Lab Results   Component Value Date    SODIUM 132 (L) 02/15/2025    POTASSIUM 4.5 02/15/2025    CHLORIDE 101 02/15/2025    CO2 20 02/15/2025    GLUCOSE 136 (H) 02/15/2025    BUN 14 02/15/2025    CREATININE 0.53 02/15/2025    GLOMRATE 93 03/24/2022        Lab Results   Component Value Date    WBC 8.3 02/13/2025    HEMOGLOBIN 12.5 (L) 02/13/2025    HEMATOCRIT 35.5 (L) 02/13/2025    PLATELETCT 76 (L) 02/13/2025        Total time of the discharge process exceeds 34 minutes.

## 2025-02-15 NOTE — CARE PLAN
The patient is Stable - Low risk of patient condition declining or worsening    Shift Goals: Pain, Monitor for bleeding, Safety  Clinical Goals: Pain, Monitor for bleeding, Safety  Patient Goals: Pain, Rest  Family Goals: Pain, Go Home    Progress made toward(s) clinical / shift goals:  Pt resting in bed, pain managed with current medication.  Pt remains free from falls and demonstrates safe mobilization by self with personal wheelchair.  This RN discussed POC and BTD with Pt, who verbalized understanding.

## 2025-02-15 NOTE — PROGRESS NOTES
Assumed care of patient at 0700. Patient is alert and oriented, respirations are unlabored and regular on room air. Patient lying in bed at this time. Pain stated at 8, appropriate pain medication administered. Lung sounds clear throughout, diminished in bases. Abdomen soft, tender, +bowel sounds, BM 2/14, tolerating diet, x3 lap sites with dermabond CDI. Voiding without difficulty. Patient states numbness to right leg, left leg, right upper arm, right face, left 3 middle toes. Bed in lowest position, call light within reach, patient states no needs at this time.

## 2025-02-15 NOTE — CARE PLAN
Problem: Pain - Standard  Goal: Alleviation of pain or a reduction in pain to the patient’s comfort goal  Outcome: Progressing     Problem: Knowledge Deficit - Standard  Goal: Patient and family/care givers will demonstrate understanding of plan of care, disease process/condition, diagnostic tests and medications  Outcome: Progressing     Problem: Fall Risk  Goal: Patient will remain free from falls  Outcome: Progressing     Problem: Mobility  Goal: Patient's capacity to carry out activities will improve  Outcome: Progressing   The patient is Stable - Low risk of patient condition declining or worsening    Shift Goals  Clinical Goals: Pain management, patient safety  Patient Goals: rest, comfort  Family Goals: Pain, Go Home    Progress made toward(s) clinical / shift goals:  Patient confused overnight. Moderate fall risk, bed frame alarm on. Very Dark urine. Patient received PO pain medication for pain management overnight. Patient is using call light appropriately for ambulation. Patient had BM.     Patient is not progressing towards the following goals:

## 2025-02-15 NOTE — PROGRESS NOTES
Telly Solomon has been provided discharge instructions, to include follow up care, home medications, and activity/diet reviewed. Copy of discharge instructions in patient chart, signed and reviewed. Patient verbalizes the understanding of the discharge instructions. Arm band removed. Patient did not have home meds during admit. Meds to Beds given. Questions and concerns addressed prior to leaving the discharge lounge. Transported via car by wife. Patient discharge to home.

## 2025-02-18 ENCOUNTER — TELEPHONE (OUTPATIENT)
Dept: SURGICAL ONCOLOGY | Facility: MEDICAL CENTER | Age: 50
End: 2025-02-18
Payer: COMMERCIAL

## 2025-02-18 NOTE — TELEPHONE ENCOUNTER
"I spoke to the patient on the phone.  He is having both leg swelling as well as wheezing when he breathes.  I would like him to go to the emergency room to get evaluated both for fluid overload/congestive heart failure as well as DVT.  They will do that at [place] and keep us posted.            1. Caller Name: Telly                        Call Back Number: [phone]    Wound 02/14/25 Incision Abdomen Dermabond (Active)         Discharged on 02/15/25.   Surgical procedure Diagnostic laparoscopy with liver biopsy 02/14/25.    The patient is reporting pain/bruising at the surgical location. At this time it is tolerable with pain medications prescribed. He reports SOB, and pain near the chest that have been going on since surgery. The patient and wife are concerned about swelling in the left leg/foot. Per the patient, he's been voiding and states \"I can't see my ankle because of how swollen my legs are.\"        Patient denies N/V, fever, wound infection, severe pain, numbness & tingling.     The patient was advised to call the clinic or seek evaluation at the ER for any of the following:              - Fever >101 F      - Wound infection (increasing redness, swelling, drainage, pus)     - Severe pain not controlled with oral medications     - Severe nausea or vomiting, inability to tolerate PO intake     - Bleeding that does not stop withholding pressure to the area       - Yellowing of the skin or eyes     - Change in mental status (confusion or lethargy)      - New numbness or weakness      - Any other concerns.    Future Appointments   Date Time Provider Department Center   2/25/2025  2:00 PM Panchito Grigsby M.D. [facility] None   3/6/2025  2:00 PM KIRK Pulido [facility]     "

## 2025-02-19 DIAGNOSIS — C34.90 SMALL CELL LUNG CANCER, UNSPECIFIED LATERALITY (HCC): ICD-10-CM

## 2025-02-19 DIAGNOSIS — C78.7 METASTASIS TO LIVER (HCC): ICD-10-CM

## 2025-02-19 NOTE — ANESTHESIA POSTPROCEDURE EVALUATION
Patient: Telly Solomon    Procedure Summary       Date: 02/14/25 Room / Location: [place] [address] / SURGERY [facility]    Anesthesia Start: 0751 Anesthesia Stop: 0841    Procedures:       BIOPSY, LIVER, LAPAROSCOPIC (Abdomen)      LAPAROSCOPY, DIAGNOSTIC (Abdomen) Diagnosis: (Metastatic Cancer)    Surgeons: Panchito Grigsby M.D. Responsible Provider: Bronwyn Sears M.D.    Anesthesia Type: general ASA Status: 3            Final Anesthesia Type: general  Last vitals  /72   Temp 36.7C   Pulse 74   Resp 16   SpO2 94%     Anesthesia Post Evaluation    Patient location during evaluation: PACU  Patient participation: complete - patient participated  Level of consciousness: awake and alert    Airway patency: patent  Anesthetic complications: no  Cardiovascular status: hemodynamically stable  Respiratory status: acceptable  Hydration status: euvolemic    PONV: none          No notable events documented.     Nurse Pain Score: 5 (NPRS)

## 2025-02-21 NOTE — Clinical Note
REFERRAL APPROVAL NOTICE         Sent on February 20, 2025                   Telly Solomon  [address]  [facility] [address]                   Dear Mr. Solomon,    After a careful review of the medical information and benefit coverage, [facility] has processed your referral. See below for additional details.    If applicable, you must be actively enrolled with your insurance for coverage of the authorized service. If you have any questions regarding your coverage, please contact your insurance directly.    REFERRAL INFORMATION   Referral #:  [id number]  Referred-To Provider    Referred-By Provider:  Hematology & Oncology    Panchito Grigsby M.D.   SERINA, DEEPAK      [address]  [facility]  [place] [address]  [phone] [address] [place] [id number] [phone]    Referral Start Date:  02/19/2025  Referral End Date:   02/19/2026             SCHEDULING  If you do not already have an appointment, please call [phone] to make an appointment.     MORE INFORMATION  If you do not already have a [employer] account, sign up at: [email or web address]  You can access your medical information, make appointments, see lab results, billing information, and more.  If you have questions regarding this referral, please contact  the [facility] Referrals department at:             [phone]. Monday - Friday 8:00AM - 5:00PM.     Sincerely,    [facility]

## 2025-05-23 NOTE — PROGRESS NOTES
Bedside report received.  Assessment complete.  A&O x 1. Confused. Patient is able to be re-oriented. Patient calls appropriately.  Patient ambulates with SBA assist w/Wheelchair. Bed frame alarm on. Patient refusing strip alarm, charge RN notified.  Patient has 4/10 pain. Patient declines intervention at this time.  Denies N&V. Tolerating regular diet.  Surgical lap sites C/D/I.  + void, + flatus, - BM.  Patient denies SOB.  SCD's refused.  Review plan with of care with patient. Call light and personal belongings within reach. Hourly rounding in place. All needs met at this time.   152.4

## (undated) DEVICE — CHLORAPREP 26 ML APPLICATOR - ORANGE TINT(25/CA)

## (undated) DEVICE — CLOSURE SKIN STRIP 1/2 X 4 IN - (STERI STRIP) (50/BX 4BX/CA)

## (undated) DEVICE — GOWN WARMING STANDARD FLEX - (30/CA)

## (undated) DEVICE — GLOVE BIOGEL SZ 8 SURGICAL PF LTX - (50PR/BX 4BX/CA)

## (undated) DEVICE — ENDO PEANUT 5MM DEVICE (12EA/BX)

## (undated) DEVICE — TROCAR 5X100 NON BLADED Z-TH - READ KII (6/BX)

## (undated) DEVICE — GLOVE BIOGEL SZ 7 SURGICAL PF LTX - (50PR/BX 4BX/CA)

## (undated) DEVICE — SODIUM CHL IRRIGATION 0.9% 1000ML (12EA/CA)

## (undated) DEVICE — SLEEVE VASO DVT COMPRESSION CALF MED - (10PR/CA)

## (undated) DEVICE — TROCAR Z THREAD11MM OPTICAL - NON BLADED(6/BX)

## (undated) DEVICE — SET LEADWIRE 5 LEAD BEDSIDE DISPOSABLE ECG (1SET OF 5/EA)

## (undated) DEVICE — SET EXTENSION WITH 2 PORTS (48EA/CA) ***PART #2C8610 IS A SUBSTITUTE*****

## (undated) DEVICE — SUTURE GENERAL

## (undated) DEVICE — DRESSING NON-ADHERING 8 X 3 - (50/BX)

## (undated) DEVICE — TROCAR LAPSCP 100MM 12MM NTHRD - (6/BX)

## (undated) DEVICE — SUTURE 0 VICRYL PLUS UR-6 - 27 INCH (36/BX)

## (undated) DEVICE — SUTURE 4-0 MONOCRYL PLUS PS-2 - 27 INCH (36/BX)

## (undated) DEVICE — COVER LIGHT HANDLE ALC PLUS DISP (18EA/BX)

## (undated) DEVICE — LACTATED RINGERS INJ 1000 ML - (14EA/CA 60CA/PF)

## (undated) DEVICE — PACK LAP CHOLE OR - (2EA/CA)

## (undated) DEVICE — GVL 3 STAT DISPOSABLE - (10/BX)

## (undated) DEVICE — SENSOR OXIMETER ADULT SPO2 RD SET (20EA/BX)

## (undated) DEVICE — NEEDLE MAXCORE BIOPSY 14G X 16CM (5EA/CA)

## (undated) DEVICE — GLOVE BIOGEL PI ORTHO SZ 7.5 PF LF (40PR/BX)

## (undated) DEVICE — TUBE E-T HI-LO CUFF 7.0MM (10EA/PK)

## (undated) DEVICE — SET SUCTION/IRRIGATION WITH DISPOSABLE TIP (6/CA )PART #0250-070-520 IS A SUB

## (undated) DEVICE — TUBING CLEARLINK DUO-VENT - C-FLO (48EA/CA)

## (undated) DEVICE — DRAPE IOBAN II INCISE 23X17 - (10EA/BX 4BX/CA)

## (undated) DEVICE — SPONGE GAUZESTER. 2X2 4-PL - (2/PK 50PK/BX 30BX/CS)

## (undated) DEVICE — CANISTER SUCTION 3000ML MECHANICAL FILTER AUTO SHUTOFF MEDI-VAC NONSTERILE LF DISP (40EA/CA)

## (undated) DEVICE — ELECTRODE DUAL RETURN W/ CORD - (50/PK)

## (undated) DEVICE — CANNULA W/SEAL 5X100 Z-THRE - ADED KII (12/BX)

## (undated) DEVICE — DRESSING TRANSPARENT FILM TEGADERM 2.375 X 2.75" (100EA/BX)"